# Patient Record
Sex: FEMALE | Race: BLACK OR AFRICAN AMERICAN | NOT HISPANIC OR LATINO | Employment: OTHER | URBAN - METROPOLITAN AREA
[De-identification: names, ages, dates, MRNs, and addresses within clinical notes are randomized per-mention and may not be internally consistent; named-entity substitution may affect disease eponyms.]

---

## 2021-10-08 ENCOUNTER — OFFICE VISIT (OUTPATIENT)
Dept: FAMILY MEDICINE CLINIC | Facility: CLINIC | Age: 81
End: 2021-10-08

## 2021-10-08 VITALS
BODY MASS INDEX: 20.61 KG/M2 | WEIGHT: 136 LBS | HEART RATE: 64 BPM | TEMPERATURE: 97.1 F | OXYGEN SATURATION: 98 % | DIASTOLIC BLOOD PRESSURE: 78 MMHG | RESPIRATION RATE: 16 BRPM | SYSTOLIC BLOOD PRESSURE: 121 MMHG | HEIGHT: 68 IN

## 2021-10-08 DIAGNOSIS — Z09 ENCOUNTER FOR FOLLOW-UP EXAMINATION AFTER COMPLETED TREATMENT FOR CONDITIONS OTHER THAN MALIGNANT NEOPLASM: ICD-10-CM

## 2021-10-08 DIAGNOSIS — E78.5 HYPERLIPIDEMIA, UNSPECIFIED HYPERLIPIDEMIA TYPE: Primary | ICD-10-CM

## 2021-10-08 DIAGNOSIS — Z13.820 SCREENING FOR OSTEOPOROSIS: ICD-10-CM

## 2021-10-08 DIAGNOSIS — Z00.00 GENERAL MEDICAL EXAM: ICD-10-CM

## 2021-10-08 DIAGNOSIS — G47.00 INSOMNIA, UNSPECIFIED TYPE: ICD-10-CM

## 2021-10-08 DIAGNOSIS — Z23 NEED FOR INFLUENZA VACCINATION: ICD-10-CM

## 2021-10-08 DIAGNOSIS — Z12.31 ENCOUNTER FOR SCREENING MAMMOGRAM FOR MALIGNANT NEOPLASM OF BREAST: ICD-10-CM

## 2021-10-08 DIAGNOSIS — N63.12 LUMP IN UPPER INNER QUADRANT OF RIGHT BREAST: ICD-10-CM

## 2021-10-08 PROCEDURE — 99387 INIT PM E/M NEW PAT 65+ YRS: CPT

## 2021-10-08 PROCEDURE — G0008 ADMIN INFLUENZA VIRUS VAC: HCPCS

## 2021-10-08 PROCEDURE — 90662 IIV NO PRSV INCREASED AG IM: CPT

## 2021-10-08 RX ORDER — RISPERIDONE 0.5 MG/1
0.5 TABLET ORAL DAILY
Qty: 90 TABLET | Refills: 1 | Status: SHIPPED | OUTPATIENT
Start: 2021-10-08 | End: 2023-01-20

## 2021-10-08 RX ORDER — PRAVASTATIN SODIUM 80 MG/1
80 TABLET ORAL DAILY
Qty: 90 TABLET | Refills: 1 | Status: SHIPPED | OUTPATIENT
Start: 2021-10-08 | End: 2022-08-05

## 2021-10-08 RX ORDER — RISPERIDONE 0.5 MG/1
TABLET ORAL
COMMUNITY
End: 2021-10-08 | Stop reason: SDUPTHER

## 2021-10-08 RX ORDER — PRAVASTATIN SODIUM 80 MG/1
TABLET ORAL
COMMUNITY
End: 2021-10-08 | Stop reason: SDUPTHER

## 2021-10-08 NOTE — PROGRESS NOTES
"Chief Complaint  Wellness Check    Subjective          Saima Ornelas presents to [facility]  History of Present Illness    Saima Ornelas 80 y.o. female who presents today as a new patient to establish care. The patient recently moved to [place] from [place] into a senior living facility.    She takes a women's daily vitamin. She reports eating a healthy diet \"once a week and I like my sweets.\" She gets exercise by walking a flight of stairs three times per day.    She noticed a small lump in her right breast two months ago. It is non-painful. No personal past history of breast cancer or abnormal mammograms. The patient's grandmother had breast cancer.     She takes Risperidone 0.5 mg daily and Pravastatin 80 mg daily. Both were prescribed from her provider in [place]. The patient is compliant with both medications and reports no side effects.    She drinks 2 glasses of wine per week. No tobacco use.       She has a problem list of the following: There is no problem list on file for this patient.        She has been compliant with the following medications:   Current Outpatient Medications:   •  pravastatin (PRAVACHOL) 80 MG tablet, Take 1 tablet by mouth Daily., Disp: 90 tablet, Rfl: 1  •  risperiDONE (risperDAL) 0.5 MG tablet, Take 1 tablet by mouth Daily., Disp: 90 tablet, Rfl: 1.        She  denies medication side effects.      All of the other chronic condition(s) listed above are stable w/o issues.    /78   Pulse 64   Temp 97.1 °F (36.2 °C)   Resp 16   Ht 172.7 cm (68\")   Wt 61.7 kg (136 lb)   SpO2 98%   BMI 20.68 kg/m²     No results found for this or any previous visit.               Objective     Vital Signs:   /78   Pulse 64   Temp 97.1 °F (36.2 °C)   Resp 16   Ht 172.7 cm (68\")   Wt 61.7 kg (136 lb)   SpO2 98%   BMI 20.68 kg/m²      Review of Systems   Constitutional: Negative for appetite change, fatigue and fever.   HENT: Negative for nosebleeds and " trouble swallowing.    Eyes: Negative for blurred vision and visual disturbance.   Respiratory: Negative for choking, shortness of breath and wheezing.    Cardiovascular: Negative for chest pain and palpitations.   Gastrointestinal: Negative for abdominal pain and blood in stool.   Genitourinary: Negative.  Positive for breast lump (Small lump on right breast). Negative for frequency and urgency.   Musculoskeletal: Negative.  Negative for gait problem.   Skin: Negative.  Negative for rash.   Allergic/Immunologic: Negative for immunocompromised state.   Neurological: Negative for dizziness, syncope, facial asymmetry, speech difficulty, weakness, light-headedness, numbness and headache.   Hematological: Negative for adenopathy.   Psychiatric/Behavioral: Negative for dysphoric mood, self-injury and suicidal ideas.         Physical Exam  Vitals and nursing note reviewed.   Constitutional:       General: She is not in acute distress.     Appearance: Normal appearance. She is well-developed and normal weight. She is not ill-appearing or toxic-appearing.   HENT:      Head: Normocephalic.      Right Ear: External ear normal.      Left Ear: External ear normal.   Eyes:      General: No scleral icterus.     Pupils: Pupils are equal, round, and reactive to light.   Neck:      Thyroid: No thyromegaly.      Vascular: No carotid bruit.   Cardiovascular:      Rate and Rhythm: Normal rate and regular rhythm.      Heart sounds: Normal heart sounds.   Pulmonary:      Effort: Pulmonary effort is normal. No respiratory distress.      Breath sounds: Normal breath sounds. No stridor.   Chest:      Breasts:         Right: No swelling, bleeding, inverted nipple, nipple discharge or tenderness.            Comments: Small, firm, fixed, hard 1 cm lump noted on right breast, located in the upper-inner position at 1:00, approximately 4-5 cm above nipple.   Musculoskeletal:         General: No deformity.      Cervical back: Normal range of  motion and neck supple.      Right lower leg: No edema.      Left lower leg: No edema.   Skin:     General: Skin is warm and dry.      Coloration: Skin is not jaundiced.      Findings: No rash.   Neurological:      General: No focal deficit present.      Mental Status: She is alert and oriented to person, place, and time.      Motor: No weakness.      Coordination: Coordination normal.      Gait: Gait normal.   Psychiatric:         Behavior: Behavior normal.         Thought Content: Thought content normal.         Judgment: Judgment normal.          Result Review :                Assessment and Plan      Diagnoses and all orders for this visit:    1. Hyperlipidemia, unspecified hyperlipidemia type (Primary)  -     pravastatin (PRAVACHOL) 80 MG tablet; Take 1 tablet by mouth Daily.  Dispense: 90 tablet; Refill: 1  -     Lipid panel    2. Lump in upper inner quadrant of right breast  -     Mammo screening digital tomosynthesis bilateral w CAD  -     Mammo Diagnostic Digital Tomosynthesis Right With CAD  -     US breast right complete    3. Screening for osteoporosis  -     DEXA Bone Density Axial    4. General medical exam  -     Comprehensive metabolic panel  -     Lipid panel  -     CBC and Differential  -     TSH    5. Need for influenza vaccination  -     Fluzone High-Dose 65+yrs    6. Insomnia, unspecified type  -     risperiDONE (risperDAL) 0.5 MG tablet; Take 1 tablet by mouth Daily.  Dispense: 90 tablet; Refill: 1    7. Encounter for follow-up examination after completed treatment for conditions other than malignant neoplasm   -     DEXA Bone Density Axial    8. Encounter for screening mammogram for malignant neoplasm of breast   -     Mammo screening digital tomosynthesis bilateral w CAD            Follow Up     Return in about 6 months (around 4/8/2022) for Next scheduled follow up.    Patient was given instructions and counseling regarding her condition or for health maintenance advice. Please see specific  information pulled into the AVS if appropriate. The patient was given information in her after visit summary regarding health maintenance after age 65.     Preventative counseling provided during visit regarding healthy diet, daily exercise, and the following:  Low glycemic index diet  Exercise 30 minutes most days of the week  Make sure you get results on any labs or tests we ordered today  We discussed medications and how to take them as prescribed  Sleep 6-8 hours each night if possible  If you have not signed up for [employer], please activate your code ASAP from your After Visit Summary today    LDL goal <100  HDL goal >60  Triglyceride goal <150  BP goal =<130/80  Fasting glucose <100    -Screening and diagnostic mammogram order, and breast ultrasound order placed today  -DEXA scan order placed today  -Influenza vaccination today  -Lab orders placed today  -Will refill medications  -Will follow-up with this patient in 6 months, and sooner if necessary  -The patient verbalized understanding of all instructions given today

## 2021-10-13 LAB
ALBUMIN SERPL-MCNC: 4.6 G/DL (ref 3.5–5.2)
ALBUMIN/GLOB SERPL: 1.4 G/DL
ALP SERPL-CCNC: 82 U/L (ref 39–117)
ALT SERPL-CCNC: 17 U/L (ref 1–33)
AST SERPL-CCNC: 25 U/L (ref 1–32)
BASOPHILS # BLD AUTO: 0.02 10*3/MM3 (ref 0–0.2)
BASOPHILS NFR BLD AUTO: 0.6 % (ref 0–1.5)
BILIRUB SERPL-MCNC: 0.4 MG/DL (ref 0–1.2)
BUN SERPL-MCNC: 18 MG/DL (ref 8–23)
BUN/CREAT SERPL: 20 (ref 7–25)
CALCIUM SERPL-MCNC: 10.1 MG/DL (ref 8.6–10.5)
CHLORIDE SERPL-SCNC: 105 MMOL/L (ref 98–107)
CHOLEST SERPL-MCNC: 242 MG/DL (ref 0–200)
CO2 SERPL-SCNC: 25.6 MMOL/L (ref 22–29)
CREAT SERPL-MCNC: 0.9 MG/DL (ref 0.57–1)
EOSINOPHIL # BLD AUTO: 0.04 10*3/MM3 (ref 0–0.4)
EOSINOPHIL NFR BLD AUTO: 1.2 % (ref 0.3–6.2)
ERYTHROCYTE [DISTWIDTH] IN BLOOD BY AUTOMATED COUNT: 13.4 % (ref 12.3–15.4)
GLOBULIN SER CALC-MCNC: 3.4 GM/DL
GLUCOSE SERPL-MCNC: 87 MG/DL (ref 65–99)
HCT VFR BLD AUTO: 34 % (ref 34–46.6)
HDLC SERPL-MCNC: 86 MG/DL (ref 40–60)
HGB BLD-MCNC: 10.6 G/DL (ref 12–15.9)
IMM GRANULOCYTES # BLD AUTO: 0.01 10*3/MM3 (ref 0–0.05)
IMM GRANULOCYTES NFR BLD AUTO: 0.3 % (ref 0–0.5)
LDLC SERPL CALC-MCNC: 146 MG/DL (ref 0–100)
LYMPHOCYTES # BLD AUTO: 0.64 10*3/MM3 (ref 0.7–3.1)
LYMPHOCYTES NFR BLD AUTO: 18.9 % (ref 19.6–45.3)
MCH RBC QN AUTO: 29.9 PG (ref 26.6–33)
MCHC RBC AUTO-ENTMCNC: 31.2 G/DL (ref 31.5–35.7)
MCV RBC AUTO: 96 FL (ref 79–97)
MONOCYTES # BLD AUTO: 0.32 10*3/MM3 (ref 0.1–0.9)
MONOCYTES NFR BLD AUTO: 9.5 % (ref 5–12)
NEUTROPHILS # BLD AUTO: 2.35 10*3/MM3 (ref 1.7–7)
NEUTROPHILS NFR BLD AUTO: 69.5 % (ref 42.7–76)
NRBC BLD AUTO-RTO: 0 /100 WBC (ref 0–0.2)
PLATELET # BLD AUTO: 219 10*3/MM3 (ref 140–450)
POTASSIUM SERPL-SCNC: 4 MMOL/L (ref 3.5–5.2)
PROT SERPL-MCNC: 8 G/DL (ref 6–8.5)
RBC # BLD AUTO: 3.54 10*6/MM3 (ref 3.77–5.28)
SODIUM SERPL-SCNC: 141 MMOL/L (ref 136–145)
TRIGL SERPL-MCNC: 63 MG/DL (ref 0–150)
TSH SERPL DL<=0.005 MIU/L-ACNC: 1.96 UIU/ML (ref 0.27–4.2)
VLDLC SERPL CALC-MCNC: 10 MG/DL (ref 5–40)
WBC # BLD AUTO: 3.38 10*3/MM3 (ref 3.4–10.8)

## 2021-10-15 DIAGNOSIS — D64.9 ANEMIA, UNSPECIFIED TYPE: Primary | ICD-10-CM

## 2021-10-15 DIAGNOSIS — D64.9 LOW HEMOGLOBIN: ICD-10-CM

## 2021-10-15 RX ORDER — FERROUS SULFATE 325(65) MG
325 TABLET ORAL 2 TIMES DAILY WITH MEALS
Qty: 60 TABLET | Refills: 2 | Status: SHIPPED | OUTPATIENT
Start: 2021-10-15 | End: 2022-03-07

## 2021-10-15 NOTE — PROGRESS NOTES
Her cholesterol was elevated so I need her to work on a low-cholesterol diet and daily exercise. I want to re-check this in 6 months at her next appointment. Her blood count is abnormal. I want her to start taking Ferrous Sulfate twice per day. I will send a prescription to her pharmacy. Also, I am going to add a referral to GI for her to be evaluated.     *Please fax the lab and add on a serum iron and ferritin due to low hemoglobin.  *I want to re-check a CBC with differential in two weeks. Inform the patient to make a lab appointment for two weeks.

## 2021-10-19 LAB
FERRITIN SERPL-MCNC: 84.5 NG/ML (ref 13–150)
IRON SATN MFR SERPL: 20 % (ref 20–50)
IRON SERPL-MCNC: 74 MCG/DL (ref 37–145)
Lab: NORMAL
TIBC SERPL-MCNC: 375 MCG/DL
UIBC SERPL-MCNC: 301 MCG/DL (ref 112–346)
WRITTEN AUTHORIZATION: NORMAL

## 2021-11-19 ENCOUNTER — TELEPHONE (OUTPATIENT)
Dept: FAMILY MEDICINE CLINIC | Facility: CLINIC | Age: 81
End: 2021-11-19

## 2021-11-19 NOTE — TELEPHONE ENCOUNTER
Caller: VIC ZUNIGA    Relationship to patient: Emergency Contact    Best call back number: [phone]    Patient is needing: PTS SISTER IS CALLING IN REGARDS TO PT POSSIBLY GOING THROUGH HALLUCINATIONS. SHE STATED THAT THE PT STAYS UP ALL NIGHT AND TELLS HER THAT SOMEONE IS HURTING HER DOGS AND FEELS LIKE PEOPLE ARE FOLLOWING HER ALL THE TIME AND DRONES ARE TOO. SHE WOULD LIKE TO DISCUSS THIS WITH APRN VIA. SISTER CANCELLED APPT TODAY DUE TO PT NOT FEELING WELL BUT ARE RESCHEDULING HERE SOON ONCE SISTER TALKS TO PT REGARDING RESCHEDULING

## 2022-01-19 ENCOUNTER — OFFICE VISIT (OUTPATIENT)
Dept: GASTROENTEROLOGY | Facility: CLINIC | Age: 82
End: 2022-01-19

## 2022-01-19 VITALS
WEIGHT: 135 LBS | BODY MASS INDEX: 20.46 KG/M2 | HEIGHT: 68 IN | SYSTOLIC BLOOD PRESSURE: 138 MMHG | DIASTOLIC BLOOD PRESSURE: 80 MMHG

## 2022-01-19 DIAGNOSIS — D64.9 NORMOCYTIC ANEMIA: Primary | ICD-10-CM

## 2022-01-19 PROCEDURE — 99203 OFFICE O/P NEW LOW 30 MIN: CPT | Performed by: INTERNAL MEDICINE

## 2022-01-19 NOTE — PROGRESS NOTES
Chief Complaint   Patient presents with   • Anemia       Subjective     HPI    Saima Ornelas is a 81 y.o. female with a past medical history noted below who presents for anemia. She moved to [place] from [place] and established with a new APRN.  She was found to have a normocytic anemia in October 2021. Iron studies at that time were totally normal with no evidence of iron deficiency.  She was put on oral iron and has been taking that since October.    Not taking NSAIDs    No family history of GI malignancies    No overt bleeding      No abdominal surgeries.    Never smoker, no ETOH.    Retired .      Prior pcp Ramirez Ames MD in [facility]    She has never had a colonoscopy.  She tells me that she does not want one    Today's visit was in the office.  Both the patient and I were wearing face masks and proper hand hygiene was performed before and after the physical exam.           Current Outpatient Medications:   •  ferrous sulfate 325 (65 FE) MG tablet, Take 1 tablet by mouth 2 (Two) Times a Day With Meals., Disp: 60 tablet, Rfl: 2  •  pravastatin (PRAVACHOL) 80 MG tablet, Take 1 tablet by mouth Daily., Disp: 90 tablet, Rfl: 1  •  risperiDONE (risperDAL) 0.5 MG tablet, Take 1 tablet by mouth Daily., Disp: 90 tablet, Rfl: 1      Objective     Vitals:    01/19/22 1355   BP: 138/80         01/19/22  1355   Weight: 61.2 kg (135 lb)     Body mass index is 20.53 kg/m².    Physical Exam  Constitutional:       General: She is not in acute distress.  Pulmonary:      Effort: Pulmonary effort is normal.   Neurological:      Mental Status: She is alert and oriented to person, place, and time.   Psychiatric:         Mood and Affect: Mood normal.         Behavior: Behavior normal.         Thought Content: Thought content normal.         Judgment: Judgment normal.             WBC   Date Value Ref Range Status   10/24/2021 3.87 (L) 4.5 - 11.0 10*3/uL Final     RBC   Date Value Ref Range Status   10/24/2021 3.40 (L) 4.0 -  5.2 10*6/uL Final     Hemoglobin   Date Value Ref Range Status   10/24/2021 10.4 (L) 12.0 - 16.0 g/dL Final     Hematocrit   Date Value Ref Range Status   10/24/2021 32.0 (L) 36.0 - 46.0 % Final     MCV   Date Value Ref Range Status   10/24/2021 94.1 80.0 - 100.0 fL Final     MCH   Date Value Ref Range Status   10/24/2021 30.6 26.0 - 34.0 pg Final     MCHC   Date Value Ref Range Status   10/24/2021 32.5 31.0 - 37.0 g/dL Final     RDW   Date Value Ref Range Status   10/24/2021 14.2 12.0 - 16.8 % Final     MPV   Date Value Ref Range Status   10/24/2021 9.7 8.4 - 12.4 fL Final     Platelets   Date Value Ref Range Status   10/24/2021 209 140 - 440 10*3/uL Final     Neutrophil Rel %   Date Value Ref Range Status   10/24/2021 70.5 45 - 80 % Final     Lymphocyte Rel %   Date Value Ref Range Status   10/24/2021 18.3 15 - 50 % Final     Monocyte Rel %   Date Value Ref Range Status   10/24/2021 9.6 0 - 15 % Final     Eosinophil %   Date Value Ref Range Status   10/24/2021 0.8 0 - 7 % Final     Basophil Rel %   Date Value Ref Range Status   10/24/2021 0.5 0 - 2 % Final     Immature Grans %   Date Value Ref Range Status   10/24/2021 0.3 0.0 - 1.0 % Final     Neutrophils Absolute   Date Value Ref Range Status   10/24/2021 2.73 2.0 - 8.8 10*3/uL Final     Lymphocytes Absolute   Date Value Ref Range Status   10/24/2021 0.71 0.7 - 5.5 10*3/uL Final     Monocytes Absolute   Date Value Ref Range Status   10/24/2021 0.37 0.0 - 1.7 10*3/uL Final     Eosinophils Absolute   Date Value Ref Range Status   10/24/2021 0.03 0.0 - 0.8 10*3/uL Final     Basophils Absolute   Date Value Ref Range Status   10/24/2021 0.02 0.0 - 0.2 10*3/uL Final     Immature Grans, Absolute   Date Value Ref Range Status   10/24/2021 0.01 0.00 - 0.10 10*3/uL Final     nRBC   Date Value Ref Range Status   10/24/2021 0 0 /100(WBC) Final       Lab Results   Component Value Date    GLUCOSE 87 10/12/2021    BUN 18 10/12/2021    CREATININE 0.90 10/12/2021    EGFRIFNONA  60 (L) 10/12/2021    EGFRIFAFRI 73 10/12/2021    BCR 20.0 10/12/2021    CO2 25.6 10/12/2021    CALCIUM 10.1 10/12/2021    PROTENTOTREF 8.0 10/12/2021    ALBUMIN 4.60 10/12/2021    LABIL2 1.4 10/12/2021    AST 25 10/12/2021    ALT 17 10/12/2021         Imaging Results (Last 7 Days)     ** No results found for the last 168 hours. **          I personally reviewed data as detailed below:     The labs listed above. 10/12/21 iron studies with 20% iron sat and ferritin 84    Office notes from: 10/8/21 pcp    No notes on file    Assessment/Plan    1.  Normocytic anemia: She does not recall a history of anemia in the past.  Iron studies are entirely normal range.    Plan  We will repeat her CBC and iron studies today along with B12 and folate.  I discussed with the patient and her sister, that if she does show signs of iron deficiency she will need bidirectional endoscopy.  However her October labs do not show iron deficiency.  She may need to be evaluated by hematology.  Denies any overt bleeding.  She is not interested in colonoscopy for screening purposes    Diagnoses and all orders for this visit:    1. Normocytic anemia (Primary)  -     Ferritin  -     Iron Profile  -     CBC & Differential  -     Vitamin B12  -     Folate        I have discussed the above plan with the patient.  They verbalize understanding and are in agreement with the plan.  They have been advised to contact the office for any questions, concerns, or changes related to their health.    Dictated utilizing Dragon dictation

## 2022-01-20 LAB
BASOPHILS # BLD AUTO: 0 X10E3/UL (ref 0–0.2)
BASOPHILS NFR BLD AUTO: 1 %
EOSINOPHIL # BLD AUTO: 0 X10E3/UL (ref 0–0.4)
EOSINOPHIL NFR BLD AUTO: 1 %
ERYTHROCYTE [DISTWIDTH] IN BLOOD BY AUTOMATED COUNT: 13.6 % (ref 11.7–15.4)
FERRITIN SERPL-MCNC: 110 NG/ML (ref 15–150)
FOLATE SERPL-MCNC: >20 NG/ML
HCT VFR BLD AUTO: 31.1 % (ref 34–46.6)
HGB BLD-MCNC: 10.3 G/DL (ref 11.1–15.9)
IMM GRANULOCYTES # BLD AUTO: 0 X10E3/UL (ref 0–0.1)
IMM GRANULOCYTES NFR BLD AUTO: 0 %
IRON SATN MFR SERPL: 22 % (ref 15–55)
IRON SERPL-MCNC: 68 UG/DL (ref 27–139)
LYMPHOCYTES # BLD AUTO: 0.8 X10E3/UL (ref 0.7–3.1)
LYMPHOCYTES NFR BLD AUTO: 22 %
MCH RBC QN AUTO: 31 PG (ref 26.6–33)
MCHC RBC AUTO-ENTMCNC: 33.1 G/DL (ref 31.5–35.7)
MCV RBC AUTO: 94 FL (ref 79–97)
MONOCYTES # BLD AUTO: 0.4 X10E3/UL (ref 0.1–0.9)
MONOCYTES NFR BLD AUTO: 10 %
NEUTROPHILS # BLD AUTO: 2.5 X10E3/UL (ref 1.4–7)
NEUTROPHILS NFR BLD AUTO: 66 %
PLATELET # BLD AUTO: 214 X10E3/UL (ref 150–450)
RBC # BLD AUTO: 3.32 X10E6/UL (ref 3.77–5.28)
TIBC SERPL-MCNC: 304 UG/DL (ref 250–450)
UIBC SERPL-MCNC: 236 UG/DL (ref 118–369)
VIT B12 SERPL-MCNC: 709 PG/ML (ref 232–1245)
WBC # BLD AUTO: 3.8 X10E3/UL (ref 3.4–10.8)

## 2022-01-24 ENCOUNTER — TELEPHONE (OUTPATIENT)
Dept: GASTROENTEROLOGY | Facility: CLINIC | Age: 82
End: 2022-01-24

## 2022-01-24 DIAGNOSIS — D64.9 NORMOCYTIC ANEMIA: Primary | ICD-10-CM

## 2022-01-24 NOTE — TELEPHONE ENCOUNTER
----- Message from Pam Grigsby MD sent at 1/24/2022  3:30 PM EST -----  Her hemoglobin remains slightly low at 10.3.  She does not have any evidence of iron deficiency.  She does not have any evidence of B12 or folate deficiency.  I think this is probably her norm but will go ahead and send her to hematology for completeness sake to make sure nothing is being missed here.

## 2022-01-24 NOTE — PROGRESS NOTES
Her hemoglobin remains slightly low at 10.3.  She does not have any evidence of iron deficiency.  She does not have any evidence of B12 or folate deficiency.  I think this is probably her norm but will go ahead and send her to hematology for completeness sake to make sure nothing is being missed here.

## 2022-01-25 NOTE — TELEPHONE ENCOUNTER
It is noted pt has upcoming appt with Dr Jules 2/15.     Attempt call to home # - rings without answer.     Per [facility] release, call to daughter, Deb.  Advise per DR Grigsby note.  Verb understanding.  States will try to ensure pt keeps upcoming appt.  Reports pt has episodes of paranoia and refuses to leave the house.

## 2022-02-16 ENCOUNTER — CONSULT (OUTPATIENT)
Dept: ONCOLOGY | Facility: CLINIC | Age: 82
End: 2022-02-16

## 2022-02-16 ENCOUNTER — LAB (OUTPATIENT)
Dept: OTHER | Facility: HOSPITAL | Age: 82
End: 2022-02-16

## 2022-02-16 VITALS
WEIGHT: 133.1 LBS | TEMPERATURE: 97.3 F | BODY MASS INDEX: 22.17 KG/M2 | OXYGEN SATURATION: 98 % | HEART RATE: 87 BPM | DIASTOLIC BLOOD PRESSURE: 92 MMHG | HEIGHT: 65 IN | RESPIRATION RATE: 18 BRPM | SYSTOLIC BLOOD PRESSURE: 184 MMHG

## 2022-02-16 DIAGNOSIS — D64.9 ANEMIA, UNSPECIFIED TYPE: Primary | ICD-10-CM

## 2022-02-16 DIAGNOSIS — D47.2 MGUS (MONOCLONAL GAMMOPATHY OF UNKNOWN SIGNIFICANCE): ICD-10-CM

## 2022-02-16 DIAGNOSIS — D64.9 NORMOCYTIC ANEMIA: Primary | ICD-10-CM

## 2022-02-16 LAB
ABO GROUP BLD: NORMAL
ALBUMIN SERPL-MCNC: 4.6 G/DL (ref 3.5–5.2)
ALBUMIN/GLOB SERPL: 1.2 G/DL
ALP SERPL-CCNC: 74 U/L (ref 39–117)
ALT SERPL W P-5'-P-CCNC: 32 U/L (ref 1–33)
ANION GAP SERPL CALCULATED.3IONS-SCNC: 11.7 MMOL/L (ref 5–15)
AST SERPL-CCNC: 32 U/L (ref 1–32)
BASOPHILS # BLD AUTO: 0.03 10*3/MM3 (ref 0–0.2)
BASOPHILS NFR BLD AUTO: 0.7 % (ref 0–1.5)
BILIRUB SERPL-MCNC: 0.5 MG/DL (ref 0–1.2)
BUN SERPL-MCNC: 18 MG/DL (ref 8–23)
BUN/CREAT SERPL: 20 (ref 7–25)
CALCIUM SPEC-SCNC: 10.4 MG/DL (ref 8.6–10.5)
CHLORIDE SERPL-SCNC: 102 MMOL/L (ref 98–107)
CO2 SERPL-SCNC: 25.3 MMOL/L (ref 22–29)
CREAT SERPL-MCNC: 0.9 MG/DL (ref 0.57–1)
DAT POLY-SP REAG RBC QL: NEGATIVE
DEPRECATED RDW RBC AUTO: 50.2 FL (ref 37–54)
EOSINOPHIL # BLD AUTO: 0.04 10*3/MM3 (ref 0–0.4)
EOSINOPHIL NFR BLD AUTO: 0.9 % (ref 0.3–6.2)
ERYTHROCYTE [DISTWIDTH] IN BLOOD BY AUTOMATED COUNT: 14.5 % (ref 12.3–15.4)
GFR SERPL CREATININE-BSD FRML MDRD: 73 ML/MIN/1.73
GLOBULIN UR ELPH-MCNC: 4 GM/DL
GLUCOSE SERPL-MCNC: 93 MG/DL (ref 65–99)
HAPTOGLOB SERPL-MCNC: 110 MG/DL (ref 30–200)
HCT VFR BLD AUTO: 34.3 % (ref 34–46.6)
HGB BLD-MCNC: 11.3 G/DL (ref 12–15.9)
IMM GRANULOCYTES # BLD AUTO: 0.02 10*3/MM3 (ref 0–0.05)
IMM GRANULOCYTES NFR BLD AUTO: 0.4 % (ref 0–0.5)
LDH SERPL-CCNC: 239 U/L (ref 135–214)
LYMPHOCYTES # BLD AUTO: 0.91 10*3/MM3 (ref 0.7–3.1)
LYMPHOCYTES NFR BLD AUTO: 19.9 % (ref 19.6–45.3)
MCH RBC QN AUTO: 31 PG (ref 26.6–33)
MCHC RBC AUTO-ENTMCNC: 32.9 G/DL (ref 31.5–35.7)
MCV RBC AUTO: 94 FL (ref 79–97)
MONOCYTES # BLD AUTO: 0.52 10*3/MM3 (ref 0.1–0.9)
MONOCYTES NFR BLD AUTO: 11.4 % (ref 5–12)
NEUTROPHILS NFR BLD AUTO: 3.05 10*3/MM3 (ref 1.7–7)
NEUTROPHILS NFR BLD AUTO: 66.7 % (ref 42.7–76)
NRBC BLD AUTO-RTO: 0 /100 WBC (ref 0–0.2)
PLATELET # BLD AUTO: 190 10*3/MM3 (ref 140–450)
PMV BLD AUTO: 10.5 FL (ref 6–12)
POTASSIUM SERPL-SCNC: 4.4 MMOL/L (ref 3.5–5.2)
PROT SERPL-MCNC: 8.6 G/DL (ref 6–8.5)
RBC # BLD AUTO: 3.65 10*6/MM3 (ref 3.77–5.28)
RH BLD: POSITIVE
SODIUM SERPL-SCNC: 139 MMOL/L (ref 136–145)
WBC NRBC COR # BLD: 4.57 10*3/MM3 (ref 3.4–10.8)

## 2022-02-16 PROCEDURE — 83521 IG LIGHT CHAINS FREE EACH: CPT | Performed by: INTERNAL MEDICINE

## 2022-02-16 PROCEDURE — 83615 LACTATE (LD) (LDH) ENZYME: CPT | Performed by: INTERNAL MEDICINE

## 2022-02-16 PROCEDURE — 86901 BLOOD TYPING SEROLOGIC RH(D): CPT

## 2022-02-16 PROCEDURE — 85025 COMPLETE CBC W/AUTO DIFF WBC: CPT | Performed by: INTERNAL MEDICINE

## 2022-02-16 PROCEDURE — 80053 COMPREHEN METABOLIC PANEL: CPT | Performed by: INTERNAL MEDICINE

## 2022-02-16 PROCEDURE — 82668 ASSAY OF ERYTHROPOIETIN: CPT | Performed by: INTERNAL MEDICINE

## 2022-02-16 PROCEDURE — 86334 IMMUNOFIX E-PHORESIS SERUM: CPT | Performed by: INTERNAL MEDICINE

## 2022-02-16 PROCEDURE — 86880 COOMBS TEST DIRECT: CPT | Performed by: INTERNAL MEDICINE

## 2022-02-16 PROCEDURE — 82784 ASSAY IGA/IGD/IGG/IGM EACH: CPT | Performed by: INTERNAL MEDICINE

## 2022-02-16 PROCEDURE — 86900 BLOOD TYPING SEROLOGIC ABO: CPT

## 2022-02-16 PROCEDURE — 99205 OFFICE O/P NEW HI 60 MIN: CPT | Performed by: INTERNAL MEDICINE

## 2022-02-16 PROCEDURE — 84165 PROTEIN E-PHORESIS SERUM: CPT | Performed by: INTERNAL MEDICINE

## 2022-02-16 PROCEDURE — 36415 COLL VENOUS BLD VENIPUNCTURE: CPT

## 2022-02-16 PROCEDURE — 83010 ASSAY OF HAPTOGLOBIN QUANT: CPT | Performed by: INTERNAL MEDICINE

## 2022-02-16 NOTE — PROGRESS NOTES
[facility]    CONSULTING IN BLOOD DISORDERS & CANCER      REASON FOR CONSULTATION/CHIEF COMPLAINT:     Evaluation & management for anemia                             REQUESTING PHYSICIAN: Pam Grigsby MD  RECORDS OBTAINED:  Records of the patients history including those from the electronic medical record were reviewed and summarized in detail.    HISTORY OF PRESENT ILLNESS:    The patient is a 81 y.o. year old female with past medical history significant for depression, HTN & dyslipidemia who was noted to have low Hb/Hct of 10.3/31.1 on a routine CBC from 1/19/22. WBC, platelets & differential were within normal limits. Additional work up showed no evidence of iron, vitamin B12 or folate deficiency. This prompted referral to this clinic.     Patient was first seen in the hematology clinic on 2/16/22. She reports of fatigue and difficulty with sleep. Her main concern is of unsafe feeling at her assisted living facility. She thinks someone is following her and trying to harm her. She has discussed this matter with her family & staff at the facility.   She denies any bleeding from anywhere. Says her appetite is low and has lost some weight. Has never had a colonoscopy.   On chart review,her hemoglobin appears to be in 9-10 gm/dl range at least since October 2021. No prior records available at this time. Patient had recently moved from [place], [place] to [place], [place].     Past Medical History:   Diagnosis Date   • Arthritis    • Depression    • Headache    • Hyperlipidemia    • Hypertension      No past surgical history on file.    MEDICATIONS    Current Outpatient Medications:   •  ferrous sulfate 325 (65 FE) MG tablet, Take 1 tablet by mouth 2 (Two) Times a Day With Meals., Disp: 60 tablet, Rfl: 2  •  pravastatin (PRAVACHOL) 80 MG tablet, Take 1 tablet by mouth Daily., Disp: 90 tablet, Rfl: 1  •  risperiDONE (risperDAL) 0.5 MG tablet, Take 1 tablet by mouth Daily., Disp: 90 tablet, Rfl: 1    ALLERGIES:   No Known  "Allergies    SOCIAL HISTORY:       Social History     Socioeconomic History   • Marital status:    Tobacco Use   • Smoking status: Never Smoker   • Smokeless tobacco: Never Used   • Tobacco comment: social   Vaping Use   • Vaping Use: Never used   Substance and Sexual Activity   • Alcohol use: Yes   • Sexual activity: Defer         FAMILY HISTORY:  No family history on file.    REVIEW OF SYSTEMS:  Constitutional: [No fevers, chills, sweats, c/o fatigue]  Eye: [No recent visual problems]  ENMT: [No ear pain, nasal congestion, sore throat]  Respiratory: [No shortness of breath, cough]  Cardiovascular: [No Chest pain, palpitations, syncope]  Gastrointestinal: [No nausea, vomiting, diarrhea]  Genitourinary: [No hematuria]  Hema/Lymph: [Negative for bruising tendency, swollen lymph glands]  Endocrine: [Negative for excessive thirst, excessive hunger]  Musculoskeletal: [Denies any musculoskeletal pain or swelling]  Integumentary: [No rash, pruritus, abrasions]  Neurologic: [ No weakness or numbness, Alert & awake]         Vitals:    02/16/22 1443   BP: (!) 184/92   Pulse: 87   Resp: 18   Temp: 97.3 °F (36.3 °C)   TempSrc: Temporal   SpO2: 98%   Weight: 60.4 kg (133 lb 1.6 oz)   Height: 166 cm (65.35\")   PainSc: 0-No pain     No flowsheet data found.   PHYSICAL EXAM:    CONSTITUTIONAL:  Vital signs reviewed.  No distress, looks comfortable.  EYES:  Conjunctiva and lids unremarkable.   EARS,NOSE,MOUTH,THROAT:  Ears and nose appear unremarkable.  Lips, teeth, gums appear unremarkable.  RESPIRATORY:  Normal respiratory effort.  Lungs clear to auscultation bilaterally.  CARDIOVASCULAR:  Normal S1, S2.  No murmurs rubs or gallops.  No significant lower extremity edema.  GASTROINTESTINAL: Abdomen appears unremarkable.  Nondistended  LYMPHATIC:  No cervical, supraclavicular lymphadenopathy.  NEURO: AAo x 3, No focal deficits.  Appears to have equal strength all 4 extremities.  MUSCULOSKELETAL:  Unremarkable digits/nails.  " "No cyanosis or clubbing.  No apparent joint deformities.  SKIN:  Warm.  No rashes.  PSYCHIATRIC:  Has flat affect & appears depressed.      RECENT LABS:        Consult on 02/16/2022   Component Date Value Ref Range Status   • Haptoglobin 02/16/2022 110  30 - 200 mg/dL Final   • LDH 02/16/2022 239* 135 - 214 U/L Final   • TIM 02/16/2022 Negative   Final   • IgG 02/16/2022 2241* 586 - 1602 mg/dL Final   • IgA 02/16/2022 156  64 - 422 mg/dL Final   • IgM 02/16/2022 20* 26 - 217 mg/dL Final    Result confirmed on concentration.   • Total Protein 02/16/2022 8.2  6.0 - 8.5 g/dL Final   • Albumin 02/16/2022 4.0  2.9 - 4.4 g/dL Final   • Alpha-1-Globulin 02/16/2022 0.3  0.0 - 0.4 g/dL Final   • Alpha-2-Globulin 02/16/2022 0.8  0.4 - 1.0 g/dL Final   • Beta Globulin 02/16/2022 1.2  0.7 - 1.3 g/dL Final   • Gamma Globulin 02/16/2022 2.0* 0.4 - 1.8 g/dL Final   • M-Nate 02/16/2022 1.6* Not Observed g/dL Final   • Globulin 02/16/2022 4.2* 2.2 - 3.9 g/dL Final   • A/G Ratio 02/16/2022 1.0  0.7 - 1.7 Final   • Immunofixation Reflex, Serum 02/16/2022 Comment*  Final    Immunofixation shows IgG monoclonal protein with kappa light chain  specificity.  Please note that samples from patients receiving DARZALEX(R)  (daratumumab) treatment can appear as an \"IgG kappa\" and mask a  complete response. If this patient is receiving MENDOZA, this HARRISON  assay interference can be removed by ordering test number  [id number]-\"Immunofixation, Daratumumab-Specific, Serum\" and  submitting a new sample for testing or by calling the lab to add  this test to the current sample.   • Please note 02/16/2022 Comment   Final    Protein electrophoresis scan will follow via computer, mail, or   delivery.   • Free Light Chain, Kappa 02/16/2022 51.8* 3.3 - 19.4 mg/L Final   • Free Lambda Light Chains 02/16/2022 6.9  5.7 - 26.3 mg/L Final   • Kappa/Lambda Ratio 02/16/2022 7.51* 0.26 - 1.65 Final   • Glucose 02/16/2022 93  65 - 99 mg/dL Final   • BUN 02/16/2022 " 18  8 - 23 mg/dL Final   • Creatinine 02/16/2022 0.90  0.57 - 1.00 mg/dL Final   • Sodium 02/16/2022 139  136 - 145 mmol/L Final   • Potassium 02/16/2022 4.4  3.5 - 5.2 mmol/L Final   • Chloride 02/16/2022 102  98 - 107 mmol/L Final   • CO2 02/16/2022 25.3  22.0 - 29.0 mmol/L Final   • Calcium 02/16/2022 10.4  8.6 - 10.5 mg/dL Final   • Total Protein 02/16/2022 8.6* 6.0 - 8.5 g/dL Final   • Albumin 02/16/2022 4.60  3.50 - 5.20 g/dL Final   • ALT (SGPT) 02/16/2022 32  1 - 33 U/L Final   • AST (SGOT) 02/16/2022 32  1 - 32 U/L Final   • Alkaline Phosphatase 02/16/2022 74  39 - 117 U/L Final   • Total Bilirubin 02/16/2022 0.5  0.0 - 1.2 mg/dL Final   • eGFR   Amer 02/16/2022 73  >60 mL/min/1.73 Final   • Globulin 02/16/2022 4.0  gm/dL Final   • A/G Ratio 02/16/2022 1.2  g/dL Final   • BUN/Creatinine Ratio 02/16/2022 20.0  7.0 - 25.0 Final   • Anion Gap 02/16/2022 11.7  5.0 - 15.0 mmol/L Final   • Erythropoietin 02/16/2022 20.2* 2.6 - 18.5 mIU/mL Final    [employer] 800 Immunoassay System  Values obtained with different assay methods or kits cannot be used  interchangeably. Results cannot be interpreted as absolute evidence  of the presence or absence of malignant disease.   • ABO Type 02/16/2022 O   Final   • RH type 02/16/2022 Positive   Final   Lab on 02/16/2022   Component Date Value Ref Range Status   • WBC 02/16/2022 4.57  3.40 - 10.80 10*3/mm3 Final   • RBC 02/16/2022 3.65* 3.77 - 5.28 10*6/mm3 Final   • Hemoglobin 02/16/2022 11.3* 12.0 - 15.9 g/dL Final   • Hematocrit 02/16/2022 34.3  34.0 - 46.6 % Final   • MCV 02/16/2022 94.0  79.0 - 97.0 fL Final   • MCH 02/16/2022 31.0  26.6 - 33.0 pg Final   • MCHC 02/16/2022 32.9  31.5 - 35.7 g/dL Final   • RDW 02/16/2022 14.5  12.3 - 15.4 % Final   • RDW-SD 02/16/2022 50.2  37.0 - 54.0 fl Final   • MPV 02/16/2022 10.5  6.0 - 12.0 fL Final   • Platelets 02/16/2022 190  140 - 450 10*3/mm3 Final   • Neutrophil % 02/16/2022 66.7  42.7 - 76.0 % Final   •  Lymphocyte % 02/16/2022 19.9  19.6 - 45.3 % Final   • Monocyte % 02/16/2022 11.4  5.0 - 12.0 % Final   • Eosinophil % 02/16/2022 0.9  0.3 - 6.2 % Final   • Basophil % 02/16/2022 0.7  0.0 - 1.5 % Final   • Immature Grans % 02/16/2022 0.4  0.0 - 0.5 % Final   • Neutrophils, Absolute 02/16/2022 3.05  1.70 - 7.00 10*3/mm3 Final   • Lymphocytes, Absolute 02/16/2022 0.91  0.70 - 3.10 10*3/mm3 Final   • Monocytes, Absolute 02/16/2022 0.52  0.10 - 0.90 10*3/mm3 Final   • Eosinophils, Absolute 02/16/2022 0.04  0.00 - 0.40 10*3/mm3 Final   • Basophils, Absolute 02/16/2022 0.03  0.00 - 0.20 10*3/mm3 Final   • Immature Grans, Absolute 02/16/2022 0.02  0.00 - 0.05 10*3/mm3 Final   • nRBC 02/16/2022 0.0  0.0 - 0.2 /100 WBC Final         ASSESSMENT:   is a 82 y/o AAF with past medical history significant for depression, HTN & dyslipidemia who comes for anemia evaluation & management.     # Normocytic anemia:  · Patient was noted to have low Hb/Hct of 10.3/31.1 on a routine CBC from 1/19/22. WBC, platelets & differential were within normal limits. Additional work up showed no evidence of iron, vitamin B12 or folate deficiency. This prompted referral to this clinic.   · On chart review,her hemoglobin appears to be in 9-10 gm/dl range at least since October 2021. No prior records available at this time. Patient had recently moved from [place], [place] to [place], [place].   · Other than fatigue & possible hallucinations, she denies any major symptoms. Has poor appetite. Denies bleeding from anywhere.   · Work up performed in this clinic on 2/16/22 show improved Hb to 11.3 gm/dl. Additional labs negative for any hemolysis. EPO level elevated. Peripheral smear review performed by me in clinic shows normocytic RBCs, no increased schistocytes, no blasts or increased immature cells, adequate platelets.    · SPEP/HARRISON & FLC show presence of IgG kappa M-protein, 1.6 gm/dl.  FLC ratio mildly elevated at 7.51. No evidence of hypercalcemia, renal  dysfunction or any other cytopenias. This is most consistent with MGUS, intermediate risk & unlikely contributing to anemia.   · Her anemia is likely age related bone marrow suppression +/- nutritional intake.   · Encouraged to increase PO intake and continue PO Iron supplements.   · Will plan to monitor for now & repeat labs in 4-5 months.     # MGUS, low risk:  · The SPEP/HARRISON & FLC from 2/16/22 show presence of IgG kappa M-protein, 1.6 gm/dl.  FLC ratio mildly elevated at 7.51. No evidence of hypercalcemia, renal dysfunction or any other cytopenias.  · This is most consistent with MGUS, intermediate riskn& unlikely contributing to anemia.   · Plan to monitor & repeat labs    # Depressions and suspected hallucinations:  She reports feeling unsafe at her assisted living facility. She thinks someone is following her and trying to harm her. She has discussed this matter with her family & staff at the facility.   Advised to continue to discuss with her family/friends. Recommended referral to psychiatry. Pt refused to referred anywhere.     # Hypertension:  BP elevated at 184/92 in clinic.  Asymptomatic. Pt is not sure if she took her amlodipine  F/u with PCP    PLAN:   - Work up performed in this clinic & so far negative for iron, B12 & folate deficiency. Negative for any hemolysis. EPO level elevated. CBC show improved Hb to 11.3 gm/dl.   - SPEP/HARRISON most consistent with MGUS, intermediate risk & unlikely contributing to anemia.   - Her anemia is likely age related bone marrow suppression +/- nutritional intake.   - Encouraged to increase PO intake and continue PO Iron supplements.   - Will plan to monitor for now & repeat labs in 4-5 months.     Orders Placed This Encounter   Procedures   • Haptoglobin     Order Specific Question:   Release to patient     Answer:   Immediate   • Lactate Dehydrogenase     Order Specific Question:   Release to patient     Answer:   Immediate   • HARRISON,PE and FLC, Serum     Order Specific  Question:   Release to patient     Answer:   Immediate   • Comprehensive Metabolic Panel     Order Specific Question:   Release to patient     Answer:   Immediate   • Erythropoietin     Order Specific Question:   Release to patient     Answer:   Immediate   • Comprehensive Metabolic Panel     Standing Status:   Future     Standing Expiration Date:   2/17/2023     Order Specific Question:   Release to patient     Answer:   Immediate   • HARRISON,PE and FLC, Serum     Standing Status:   Future     Standing Expiration Date:   2/17/2023     Order Specific Question:   Release to patient     Answer:   Immediate   • Direct Antiglobulin Test (Direct Chepe)     Order Specific Question:   Release to patient     Answer:   Immediate   • ABO RH Specimen Verification     Order Specific Question:   Release to patient     Answer:   Immediate   • CBC & Differential     Standing Status:   Future     Standing Expiration Date:   2/17/2023     Order Specific Question:   Manual Differential     Answer:   No     Total time spent during this patient encounter is 65 minutes. The total time spent with the patient includes at least one or more of the following: preparing to see the patient by reviewing of tests, prior notes or other relevant information, performing appropriate independent examination & evaluation, counseling, ordering of medications, tests or procedures, communicating with other healthcare professionals, when appropriate to coordinate care, documenting clinic information in the electronic medical records or other health records, independently interpreting results of tests and communicating the results to the patient/family or caregiver.

## 2022-02-17 LAB
ALBUMIN SERPL ELPH-MCNC: 4 G/DL (ref 2.9–4.4)
ALBUMIN/GLOB SERPL: 1 {RATIO} (ref 0.7–1.7)
ALPHA1 GLOB SERPL ELPH-MCNC: 0.3 G/DL (ref 0–0.4)
ALPHA2 GLOB SERPL ELPH-MCNC: 0.8 G/DL (ref 0.4–1)
B-GLOBULIN SERPL ELPH-MCNC: 1.2 G/DL (ref 0.7–1.3)
EPO SERPL-ACNC: 20.2 MIU/ML (ref 2.6–18.5)
GAMMA GLOB SERPL ELPH-MCNC: 2 G/DL (ref 0.4–1.8)
GLOBULIN SER-MCNC: 4.2 G/DL (ref 2.2–3.9)
IGA SERPL-MCNC: 156 MG/DL (ref 64–422)
IGG SERPL-MCNC: 2241 MG/DL (ref 586–1602)
IGM SERPL-MCNC: 20 MG/DL (ref 26–217)
INTERPRETATION SERPL IEP-IMP: ABNORMAL
KAPPA LC FREE SER-MCNC: 51.8 MG/L (ref 3.3–19.4)
KAPPA LC FREE/LAMBDA FREE SER: 7.51 {RATIO} (ref 0.26–1.65)
LABORATORY COMMENT REPORT: ABNORMAL
LAMBDA LC FREE SERPL-MCNC: 6.9 MG/L (ref 5.7–26.3)
M PROTEIN SERPL ELPH-MCNC: 1.6 G/DL
PROT SERPL-MCNC: 8.2 G/DL (ref 6–8.5)

## 2022-03-02 ENCOUNTER — TELEPHONE (OUTPATIENT)
Dept: ONCOLOGY | Facility: CLINIC | Age: 82
End: 2022-03-02

## 2022-03-02 NOTE — TELEPHONE ENCOUNTER
Caller: Saima Ornelas    Relationship: Self    Best call back number: [phone]    Caller requesting test results:YES    What test was performed: BLOODWORK    When was the test performed:  02/16/22    Where was the test performed: [facility]

## 2022-03-04 NOTE — TELEPHONE ENCOUNTER
Spoke with pt and reviewed Dr Jules's message with her.  She v/u, no questions or concerns about this.  She did inquire about follow up with her PCP, she said when she was there last she ordered some tests that she has not yet scheduled, advised her to call Meredith Ma's office.  She v/u.

## 2022-03-07 DIAGNOSIS — D64.9 ANEMIA, UNSPECIFIED TYPE: ICD-10-CM

## 2022-03-07 RX ORDER — FERROUS SULFATE 325(65) MG
TABLET ORAL
Qty: 60 TABLET | Refills: 0 | Status: SHIPPED | OUTPATIENT
Start: 2022-03-07 | End: 2022-10-06

## 2022-04-08 ENCOUNTER — TELEPHONE (OUTPATIENT)
Dept: FAMILY MEDICINE CLINIC | Facility: CLINIC | Age: 82
End: 2022-04-08

## 2022-04-08 NOTE — TELEPHONE ENCOUNTER
I tried calling patient her phone is discounted. She was sent a my chart message about her appointment .

## 2022-04-08 NOTE — TELEPHONE ENCOUNTER
Caller: Johnson Saima    Relationship: Self    Best call back number: [phone]     What is the best time to reach you: ANYTIME     Who are you requesting to speak with (clinical staff, provider,  specific staff member): CLINICAL    What was the call regarding: PATIENT CALLED TO SAY SHE DID NOT KNOW THAT HER APPOINTMENT FOR TODAY WITH [facility] WAS CANCELLED.   SHE HAD TO GET A RIDE TO THE APPOINTMENT TODAY, AND SHE FOUND OUT WHEN SHE GOT TO THE LAB THAT HER APPOINTMENT WAS  CANCELLED.  PATIENT STATES THAT SHE DOES NOT WANT TO RESCHEDULE THIS APPOINTMENT UNTIL AFTER SHE SPEAKS TO [facility] OR OTHER OFFICE STAFF ABOUT WHY IT WAS CANCELLED AND WHY SHE WAS NOT INFORMED BECAUSE SHE WENT TO A LOT OF TROUBLE GETTING TRANSPORTATION AND GETTING TO THE APPOINTMENT.      Do you require a callback:  YES

## 2022-05-27 ENCOUNTER — TELEPHONE (OUTPATIENT)
Dept: FAMILY MEDICINE CLINIC | Facility: CLINIC | Age: 82
End: 2022-05-27

## 2022-05-27 NOTE — TELEPHONE ENCOUNTER
Caller: VIC ZUNIGA    Relationship: Emergency Contact    Best call back number: [phone]    What is the medical concern/diagnosis: MEMORY ISSUES     What specialty or service is being requested: SOMEONE FOR MEMORY ISSUES     Any additional details: STATES PATIENT HAVING MEMORY ISSUES AND WANTS TO SEE A SPECIALIST

## 2022-05-31 ENCOUNTER — OFFICE VISIT (OUTPATIENT)
Dept: FAMILY MEDICINE CLINIC | Facility: CLINIC | Age: 82
End: 2022-05-31

## 2022-05-31 VITALS
RESPIRATION RATE: 16 BRPM | HEART RATE: 66 BPM | WEIGHT: 133 LBS | SYSTOLIC BLOOD PRESSURE: 158 MMHG | DIASTOLIC BLOOD PRESSURE: 98 MMHG | OXYGEN SATURATION: 99 % | HEIGHT: 65 IN | TEMPERATURE: 97.9 F | BODY MASS INDEX: 22.16 KG/M2

## 2022-05-31 DIAGNOSIS — F41.9 ANXIETY: ICD-10-CM

## 2022-05-31 DIAGNOSIS — R44.3 HALLUCINATIONS: ICD-10-CM

## 2022-05-31 DIAGNOSIS — F43.9 STRESS AT HOME: ICD-10-CM

## 2022-05-31 DIAGNOSIS — G47.9 SLEEP DISTURBANCES: ICD-10-CM

## 2022-05-31 DIAGNOSIS — F32.A DEPRESSION, UNSPECIFIED DEPRESSION TYPE: ICD-10-CM

## 2022-05-31 DIAGNOSIS — I10 PRIMARY HYPERTENSION: Primary | ICD-10-CM

## 2022-05-31 PROBLEM — H16.109 SUPERFICIAL KERATITIS: Status: ACTIVE | Noted: 2022-05-31

## 2022-05-31 PROBLEM — D50.9 IRON DEFICIENCY ANEMIA: Status: ACTIVE | Noted: 2021-10-23

## 2022-05-31 PROBLEM — E78.5 HYPERLIPIDEMIA: Status: ACTIVE | Noted: 2021-10-23

## 2022-05-31 PROBLEM — R68.89 SPELLS OF DECREASED ATTENTIVENESS: Status: ACTIVE | Noted: 2021-10-23

## 2022-05-31 PROBLEM — R42 VERTIGO: Status: ACTIVE | Noted: 2022-05-31

## 2022-05-31 PROBLEM — H25.10 NUCLEAR SENILE CATARACT: Status: ACTIVE | Noted: 2022-05-31

## 2022-05-31 PROCEDURE — 99214 OFFICE O/P EST MOD 30 MIN: CPT

## 2022-05-31 RX ORDER — AMLODIPINE BESYLATE 10 MG/1
10 TABLET ORAL DAILY
COMMUNITY
End: 2022-05-31 | Stop reason: DRUGHIGH

## 2022-05-31 RX ORDER — AMLODIPINE BESYLATE 5 MG/1
5 TABLET ORAL DAILY
Qty: 90 TABLET | Refills: 1 | Status: SHIPPED | OUTPATIENT
Start: 2022-05-31 | End: 2022-11-27

## 2022-05-31 NOTE — PROGRESS NOTES
"Chief Complaint  Hypertension and Depression (Hallucinations)    Subjective          Saima Ornelas presents to [facility]    History of Present Illness    Saima Ornelas 81 y.o. female who presents today for medical management of the below listed issues.     The patient is prescribed Amlodipine 10 mg daily for blood pressure, but she has not been taking medication. The patient reports she was told by an unknown doctor years ago to not take the medication if her blood pressure was within normal limits. She states she has not taken the medication in three months, and she only took 1/2 tablet then.  Her manual blood pressure today is 158/98 mmHg.  She monitors blood pressure at home twice per week. Recently, her blood pressures at home have been 145-150 over  mmHg per patient.  She is asymptomatic.  She denies chest pain, shortness of breath, palpitations, headaches, blurred vision, dizziness, weakness, slurred speech, near-syncope, and syncope.    The patient also reports hallucinations, increased stress, anxiety, depressed mood, and sleep disturbances.  She reports 20-25 people are following her daily. She reports hearing drones flying over her home and obscure smells in her home. The patient has a family friend in attendance for today's visit with her permission, and he verifies the patient's reported symptoms are accurate.  She denies suicidal thoughts, suicidal ideas, and self injury.    She  denies medication side effects.        Objective     Vital Signs:   /98   Pulse 66   Temp 97.9 °F (36.6 °C)   Resp 16   Ht 166 cm (65.35\")   Wt 60.3 kg (133 lb)   SpO2 99%   BMI 21.90 kg/m²      Review of Systems   Constitutional: Negative for appetite change, diaphoresis, fatigue and fever.   HENT: Negative for nosebleeds and trouble swallowing.    Eyes: Negative for blurred vision and visual disturbance.   Respiratory: Negative for choking, chest tightness, shortness of breath and " wheezing.    Cardiovascular: Negative for chest pain and palpitations.   Gastrointestinal: Negative for abdominal pain, blood in stool, nausea and vomiting.   Genitourinary: Negative.    Musculoskeletal: Negative.    Skin: Negative.    Allergic/Immunologic: Negative for immunocompromised state.   Neurological: Negative for dizziness, seizures, syncope, facial asymmetry, speech difficulty, weakness, light-headedness, numbness, headache and memory problem.   Hematological: Negative for adenopathy.   Psychiatric/Behavioral: Positive for hallucinations, sleep disturbance, depressed mood and stress. Negative for behavioral problems, dysphoric mood, self-injury and suicidal ideas. The patient is nervous/anxious.          Physical Exam  Vitals and nursing note reviewed.   Constitutional:       General: She is not in acute distress.     Appearance: Normal appearance. She is well-developed. She is not ill-appearing or toxic-appearing.   HENT:      Head: Normocephalic.      Right Ear: External ear normal.      Left Ear: External ear normal.   Eyes:      General: No scleral icterus.     Pupils: Pupils are equal, round, and reactive to light.   Neck:      Thyroid: No thyromegaly.   Cardiovascular:      Rate and Rhythm: Normal rate and regular rhythm.      Heart sounds: Normal heart sounds.   Pulmonary:      Effort: Pulmonary effort is normal. No respiratory distress.      Breath sounds: Normal breath sounds. No stridor.   Musculoskeletal:         General: No deformity.      Cervical back: Normal range of motion and neck supple.   Skin:     General: Skin is warm.      Coloration: Skin is not jaundiced.   Neurological:      General: No focal deficit present.      Mental Status: She is alert. She is confused.      Cranial Nerves: No dysarthria or facial asymmetry.      Sensory: Sensation is intact.      Motor: No weakness, tremor, atrophy or abnormal muscle tone.      Coordination: Coordination is intact.      Gait: Gait is  intact. Gait normal.   Psychiatric:         Attention and Perception: Attention normal.         Mood and Affect: Mood is anxious. Mood is not depressed. Affect is not tearful.         Speech: Speech normal. Speech is not slurred.         Behavior: Behavior normal.         Thought Content: Thought content normal. Thought content does not include suicidal ideation. Thought content does not include suicidal plan.         Judgment: Judgment normal.                     Assessment and Plan      Diagnoses and all orders for this visit:    1. Primary hypertension (Primary)  Comments:  Uncontrolled, not taking medication  Start Amlodipine 5 mg daily  Low-sodium diet, decrease stress and caffeine. Check BP at home.  Follow-up in 4 weeks  Orders:  -     amLODIPine (NORVASC) 5 MG tablet; Take 1 tablet by mouth Daily for 180 days.  Dispense: 90 tablet; Refill: 1    2. Hallucinations  Comments:  Initial encounter  Referral to Psychiatry for further evaluation  Orders:  -     Cancel: Ambulatory Referral to Psychiatry  -     Ambulatory Referral to Psychiatry    3. Depression, unspecified depression type  Comments:  Initial encounter  Denies suicidal thoughts or plan  Referral to Psychiatry for further evaluation  Orders:  -     Cancel: Ambulatory Referral to Psychiatry  -     Ambulatory Referral to Psychiatry    4. Stress at home  Comments:  Initial encounter  Referral to Psychiatry for further evaluation  Orders:  -     Cancel: Ambulatory Referral to Psychiatry  -     Ambulatory Referral to Psychiatry    5. Sleep disturbances  Comments:  Initial encounter  Referral to Psychiatry for further evaluation  Orders:  -     Cancel: Ambulatory Referral to Psychiatry  -     Ambulatory Referral to Psychiatry    6. Anxiety  Comments:  Initial encounter  Referral to Psychiatry for further evaluation  Orders:  -     Cancel: Ambulatory Referral to Psychiatry  -     Ambulatory Referral to Psychiatry            Follow Up     Return in about 4  weeks (around 6/28/2022) for Next scheduled follow up.    Patient was given instructions and counseling regarding her condition or for health maintenance advice. Please see specific information pulled into the AVS if appropriate.     -Bonds for safety were provided today. The patient was encouraged to seek immediate mental health evaluation at [facility] emergency psychiatric services for worsening hallucinations, depression, suicidal thoughts, suicidal plan, or thoughts of self-harm.   -Follow up in 4 weeks for blood pressure recheck.

## 2022-06-06 DIAGNOSIS — N63.12 LUMP IN UPPER INNER QUADRANT OF RIGHT BREAST: Primary | ICD-10-CM

## 2022-07-11 ENCOUNTER — LAB (OUTPATIENT)
Dept: OTHER | Facility: HOSPITAL | Age: 82
End: 2022-07-11

## 2022-07-11 ENCOUNTER — OFFICE VISIT (OUTPATIENT)
Dept: ONCOLOGY | Facility: CLINIC | Age: 82
End: 2022-07-11

## 2022-07-11 VITALS
TEMPERATURE: 97.1 F | DIASTOLIC BLOOD PRESSURE: 88 MMHG | BODY MASS INDEX: 22.36 KG/M2 | RESPIRATION RATE: 16 BRPM | WEIGHT: 134.2 LBS | OXYGEN SATURATION: 98 % | HEIGHT: 65 IN | HEART RATE: 64 BPM | SYSTOLIC BLOOD PRESSURE: 151 MMHG

## 2022-07-11 DIAGNOSIS — D64.9 ANEMIA, UNSPECIFIED TYPE: Primary | ICD-10-CM

## 2022-07-11 DIAGNOSIS — D64.9 ANEMIA, UNSPECIFIED TYPE: ICD-10-CM

## 2022-07-11 DIAGNOSIS — D47.2 MGUS (MONOCLONAL GAMMOPATHY OF UNKNOWN SIGNIFICANCE): ICD-10-CM

## 2022-07-11 LAB
ALBUMIN SERPL-MCNC: 4.3 G/DL (ref 3.5–5.2)
ALBUMIN/GLOB SERPL: 1.1 G/DL
ALP SERPL-CCNC: 72 U/L (ref 39–117)
ALT SERPL W P-5'-P-CCNC: 25 U/L (ref 1–33)
ANION GAP SERPL CALCULATED.3IONS-SCNC: 9.8 MMOL/L (ref 5–15)
AST SERPL-CCNC: 30 U/L (ref 1–32)
BASOPHILS # BLD AUTO: 0.02 10*3/MM3 (ref 0–0.2)
BASOPHILS NFR BLD AUTO: 0.5 % (ref 0–1.5)
BILIRUB SERPL-MCNC: 0.3 MG/DL (ref 0–1.2)
BUN SERPL-MCNC: 18 MG/DL (ref 8–23)
BUN/CREAT SERPL: 19.1 (ref 7–25)
CALCIUM SPEC-SCNC: 9.7 MG/DL (ref 8.6–10.5)
CHLORIDE SERPL-SCNC: 106 MMOL/L (ref 98–107)
CO2 SERPL-SCNC: 25.2 MMOL/L (ref 22–29)
CREAT SERPL-MCNC: 0.94 MG/DL (ref 0.57–1)
DEPRECATED RDW RBC AUTO: 53.7 FL (ref 37–54)
EGFRCR SERPLBLD CKD-EPI 2021: 61.5 ML/MIN/1.73
EOSINOPHIL # BLD AUTO: 0.04 10*3/MM3 (ref 0–0.4)
EOSINOPHIL NFR BLD AUTO: 1 % (ref 0.3–6.2)
ERYTHROCYTE [DISTWIDTH] IN BLOOD BY AUTOMATED COUNT: 14.7 % (ref 12.3–15.4)
GLOBULIN UR ELPH-MCNC: 4 GM/DL
GLUCOSE SERPL-MCNC: 94 MG/DL (ref 65–99)
HCT VFR BLD AUTO: 32.4 % (ref 34–46.6)
HGB BLD-MCNC: 10.3 G/DL (ref 12–15.9)
IMM GRANULOCYTES # BLD AUTO: 0.01 10*3/MM3 (ref 0–0.05)
IMM GRANULOCYTES NFR BLD AUTO: 0.3 % (ref 0–0.5)
LYMPHOCYTES # BLD AUTO: 0.7 10*3/MM3 (ref 0.7–3.1)
LYMPHOCYTES NFR BLD AUTO: 17.6 % (ref 19.6–45.3)
MCH RBC QN AUTO: 31.1 PG (ref 26.6–33)
MCHC RBC AUTO-ENTMCNC: 31.8 G/DL (ref 31.5–35.7)
MCV RBC AUTO: 97.9 FL (ref 79–97)
MONOCYTES # BLD AUTO: 0.39 10*3/MM3 (ref 0.1–0.9)
MONOCYTES NFR BLD AUTO: 9.8 % (ref 5–12)
NEUTROPHILS NFR BLD AUTO: 2.81 10*3/MM3 (ref 1.7–7)
NEUTROPHILS NFR BLD AUTO: 70.8 % (ref 42.7–76)
NRBC BLD AUTO-RTO: 0 /100 WBC (ref 0–0.2)
PLATELET # BLD AUTO: 218 10*3/MM3 (ref 140–450)
PMV BLD AUTO: 9.7 FL (ref 6–12)
POTASSIUM SERPL-SCNC: 4.1 MMOL/L (ref 3.5–5.2)
PROT SERPL-MCNC: 8.3 G/DL (ref 6–8.5)
RBC # BLD AUTO: 3.31 10*6/MM3 (ref 3.77–5.28)
SODIUM SERPL-SCNC: 141 MMOL/L (ref 136–145)
WBC NRBC COR # BLD: 3.97 10*3/MM3 (ref 3.4–10.8)

## 2022-07-11 PROCEDURE — 84165 PROTEIN E-PHORESIS SERUM: CPT | Performed by: INTERNAL MEDICINE

## 2022-07-11 PROCEDURE — 36415 COLL VENOUS BLD VENIPUNCTURE: CPT

## 2022-07-11 PROCEDURE — 80053 COMPREHEN METABOLIC PANEL: CPT | Performed by: INTERNAL MEDICINE

## 2022-07-11 PROCEDURE — 83521 IG LIGHT CHAINS FREE EACH: CPT | Performed by: INTERNAL MEDICINE

## 2022-07-11 PROCEDURE — 86334 IMMUNOFIX E-PHORESIS SERUM: CPT | Performed by: INTERNAL MEDICINE

## 2022-07-11 PROCEDURE — 85025 COMPLETE CBC W/AUTO DIFF WBC: CPT | Performed by: INTERNAL MEDICINE

## 2022-07-11 PROCEDURE — 82784 ASSAY IGA/IGD/IGG/IGM EACH: CPT | Performed by: INTERNAL MEDICINE

## 2022-07-11 PROCEDURE — 99215 OFFICE O/P EST HI 40 MIN: CPT | Performed by: INTERNAL MEDICINE

## 2022-07-11 NOTE — PROGRESS NOTES
[facility]    CONSULTING IN BLOOD DISORDERS & CANCER      REASON FOR CONSULTATION/CHIEF COMPLAINT:     Evaluation & management for anemia                             REQUESTING PHYSICIAN: No ref. provider found  RECORDS OBTAINED:  Records of the patients history including those from the electronic medical record were reviewed and summarized in detail.    HISTORY OF PRESENT ILLNESS:    The patient is a 81 y.o. year old female with past medical history significant for depression, HTN & dyslipidemia who was noted to have low Hb/Hct of 10.3/31.1 on a routine CBC from 1/19/22. WBC, platelets & differential were within normal limits. Additional work up showed no evidence of iron, vitamin B12 or folate deficiency. This prompted referral to this clinic.     Patient was first seen in the hematology clinic on 2/16/22. She reports of fatigue and difficulty with sleep. Her main concern is of unsafe feeling at her assisted living facility. She thinks someone is following her and trying to harm her. She has discussed this matter with her family & staff at the facility.   She denies any bleeding from anywhere. Says her appetite is low and has lost some weight. Has never had a colonoscopy.   On chart review,her hemoglobin appears to be in 9-10 gm/dl range at least since October 2021. No prior records available at this time. Patient had recently moved from [place], [place] to [place], [place].     Work-up performed in this clinic on 2/16/22 show improved Hb to 11.3 gm/dl. Additional labs negative for any hemolysis. EPO level elevated. Peripheral smear review performed by me in clinic shows normocytic RBCs, no increased schistocytes, no blasts or increased immature cells, adequate platelets.    SPEP/HARRISON & FLC show presence of IgG kappa M-protein, 1.6 gm/dl.  FLC ratio mildly elevated at 7.51. No evidence of hypercalcemia, renal dysfunction or any other cytopenias. This is most consistent with MGUS, intermediate risk & unlikely contributing to  anemia.   Her anemia is likely age related bone marrow suppression +/- nutritional intake.     INTERIM HISTORY:  Patient returns to the clinic for a follow-up visit with her daughter.  She denies any new complaints.  She continues to feel very fatigued.  She continues to be stressed about her living situation where she is concerned someone is following her.  She is quite distressed about her not being able to be as active but as when she was working.  She takes iron tablets every day without any adverse effects.    Past Medical History:   Diagnosis Date   • Arthritis    • Depression    • Headache    • Hyperlipidemia    • Hypertension      No past surgical history on file.    MEDICATIONS    Current Outpatient Medications:   •  amLODIPine (NORVASC) 5 MG tablet, Take 1 tablet by mouth Daily for 180 days., Disp: 90 tablet, Rfl: 1  •  FeroSul 325 (65 Fe) MG tablet, TAKE 1 TABLET BY MOUTH TWICE DAILY WITH MEALS, Disp: 60 tablet, Rfl: 0  •  pravastatin (PRAVACHOL) 80 MG tablet, Take 1 tablet by mouth Daily., Disp: 90 tablet, Rfl: 1  •  risperiDONE (risperDAL) 0.5 MG tablet, Take 1 tablet by mouth Daily., Disp: 90 tablet, Rfl: 1    ALLERGIES:   No Known Allergies    SOCIAL HISTORY:       Social History     Socioeconomic History   • Marital status:    Tobacco Use   • Smoking status: Never Smoker   • Smokeless tobacco: Never Used   • Tobacco comment: social   Vaping Use   • Vaping Use: Never used   Substance and Sexual Activity   • Alcohol use: Yes   • Sexual activity: Defer         FAMILY HISTORY:  No family history on file.  Nonpertinent  REVIEW OF SYSTEMS:  Constitutional: [No fevers, chills, sweats, c/o fatigue]  Eye: [No recent visual problems]  ENMT: [No ear pain, nasal congestion, sore throat]  Respiratory: [No shortness of breath, cough]  Cardiovascular: [No Chest pain, palpitations, syncope]  Gastrointestinal: [No nausea, vomiting, diarrhea]  Genitourinary: [No hematuria]  Hema/Lymph: [Negative for bruising  "tendency, swollen lymph glands]  Endocrine: [Negative for excessive thirst, excessive hunger]  Musculoskeletal: [Denies any musculoskeletal pain or swelling]  Integumentary: [No rash, pruritus, abrasions]  Neurologic: [ No weakness or numbness, Alert & awake]         Vitals:    07/11/22 1134   BP: 151/88  Comment: TAKES BP MED IN THE AFTERNOON   Pulse: 64   Resp: 16   Temp: 97.1 °F (36.2 °C)   TempSrc: Temporal   SpO2: 98%   Weight: 60.9 kg (134 lb 3.2 oz)   Height: 166 cm (65.35\")   PainSc: 0-No pain     Current Status 7/11/2022   ECOG score 0      PHYSICAL EXAM:    CONSTITUTIONAL:  Vital signs reviewed.  No distress, looks comfortable.  EYES:  Conjunctiva and lids unremarkable.   EARS,NOSE,MOUTH,THROAT:  Ears and nose appear unremarkable.  RESPIRATORY:  Normal respiratory effort.  Lungs clear to auscultation bilaterally.  CARDIOVASCULAR:  Normal S1, S2.  No murmurs rubs or gallops.  No significant lower extremity edema.  GASTROINTESTINAL: Abdomen appears unremarkable.  Nondistended  LYMPHATIC:  No cervical, supraclavicular lymphadenopathy.  NEURO: AAo x 3, No focal deficits.  Appears to have equal strength all 4 extremities.  MUSCULOSKELETAL:  Unremarkable digits/nails.  No cyanosis or clubbing.  No apparent joint deformities.  SKIN:  Warm.  No rashes.  PSYCHIATRIC:  Has flat affect & appears depressed.      RECENT LABS:        Lab on 07/11/2022   Component Date Value Ref Range Status   • Glucose 07/11/2022 94  65 - 99 mg/dL Final   • BUN 07/11/2022 18  8 - 23 mg/dL Final   • Creatinine 07/11/2022 0.94  0.57 - 1.00 mg/dL Final   • Sodium 07/11/2022 141  136 - 145 mmol/L Final   • Potassium 07/11/2022 4.1  3.5 - 5.2 mmol/L Final   • Chloride 07/11/2022 106  98 - 107 mmol/L Final   • CO2 07/11/2022 25.2  22.0 - 29.0 mmol/L Final   • Calcium 07/11/2022 9.7  8.6 - 10.5 mg/dL Final   • Total Protein 07/11/2022 8.3  6.0 - 8.5 g/dL Final   • Albumin 07/11/2022 4.30  3.50 - 5.20 g/dL Final   • ALT (SGPT) 07/11/2022 25  1 " - 33 U/L Final   • AST (SGOT) 07/11/2022 30  1 - 32 U/L Final   • Alkaline Phosphatase 07/11/2022 72  39 - 117 U/L Final   • Total Bilirubin 07/11/2022 0.3  0.0 - 1.2 mg/dL Final   • Globulin 07/11/2022 4.0  gm/dL Final   • A/G Ratio 07/11/2022 1.1  g/dL Final   • BUN/Creatinine Ratio 07/11/2022 19.1  7.0 - 25.0 Final   • Anion Gap 07/11/2022 9.8  5.0 - 15.0 mmol/L Final   • eGFR 07/11/2022 61.5  >60.0 mL/min/1.73 Final    National Kidney Foundation and American Society of Nephrology (ASN) Task Force recommended calculation based on the Chronic Kidney Disease Epidemiology Collaboration (CKD-EPI) equation refit without adjustment for race.   • WBC 07/11/2022 3.97  3.40 - 10.80 10*3/mm3 Final   • RBC 07/11/2022 3.31 (A) 3.77 - 5.28 10*6/mm3 Final   • Hemoglobin 07/11/2022 10.3 (A) 12.0 - 15.9 g/dL Final   • Hematocrit 07/11/2022 32.4 (A) 34.0 - 46.6 % Final   • MCV 07/11/2022 97.9 (A) 79.0 - 97.0 fL Final   • MCH 07/11/2022 31.1  26.6 - 33.0 pg Final   • MCHC 07/11/2022 31.8  31.5 - 35.7 g/dL Final   • RDW 07/11/2022 14.7  12.3 - 15.4 % Final   • RDW-SD 07/11/2022 53.7  37.0 - 54.0 fl Final   • MPV 07/11/2022 9.7  6.0 - 12.0 fL Final   • Platelets 07/11/2022 218  140 - 450 10*3/mm3 Final   • Neutrophil % 07/11/2022 70.8  42.7 - 76.0 % Final   • Lymphocyte % 07/11/2022 17.6 (A) 19.6 - 45.3 % Final   • Monocyte % 07/11/2022 9.8  5.0 - 12.0 % Final   • Eosinophil % 07/11/2022 1.0  0.3 - 6.2 % Final   • Basophil % 07/11/2022 0.5  0.0 - 1.5 % Final   • Immature Grans % 07/11/2022 0.3  0.0 - 0.5 % Final   • Neutrophils, Absolute 07/11/2022 2.81  1.70 - 7.00 10*3/mm3 Final   • Lymphocytes, Absolute 07/11/2022 0.70  0.70 - 3.10 10*3/mm3 Final   • Monocytes, Absolute 07/11/2022 0.39  0.10 - 0.90 10*3/mm3 Final   • Eosinophils, Absolute 07/11/2022 0.04  0.00 - 0.40 10*3/mm3 Final   • Basophils, Absolute 07/11/2022 0.02  0.00 - 0.20 10*3/mm3 Final   • Immature Grans, Absolute 07/11/2022 0.01  0.00 - 0.05 10*3/mm3 Final   • nRBC  07/11/2022 0.0  0.0 - 0.2 /100 WBC Final         ASSESSMENT:   is a 82 y/o AAF with past medical history significant for depression, HTN & dyslipidemia who comes for anemia evaluation & management.     # Normocytic anemia:  · Patient was noted to have low Hb/Hct of 10.3/31.1 on a routine CBC from 1/19/22. WBC, platelets & differential were within normal limits. Additional work up showed no evidence of iron, vitamin B12 or folate deficiency. This prompted referral to this clinic.   · On chart review,her hemoglobin appears to be in 9-10 gm/dl range at least since October 2021. No prior records available at this time. Patient had recently moved from [place], [place] to [place], [place].   · Other than fatigue & possible hallucinations, she denies any major symptoms. Has poor appetite. Denies bleeding from anywhere.   · Work up performed in this clinic on 2/16/22 show improved Hb to 11.3 gm/dl. Additional labs negative for any hemolysis. EPO level elevated. Peripheral smear review performed by me in clinic shows normocytic RBCs, no increased schistocytes, no blasts or increased immature cells, adequate platelets.    · SPEP/HARRISON & FLC show presence of IgG kappa M-protein, 1.6 gm/dl.  FLC ratio mildly elevated at 7.51. No evidence of hypercalcemia, renal dysfunction or any other cytopenias. This is most consistent with MGUS, intermediate risk & unlikely contributing to anemia.   · Her anemia is likely age related bone marrow suppression +/- nutritional intake.   · CBC from 7/11/2022 shows overall stable hemoglobin at 10.3.  · Encouraged to increase PO intake and continue PO Iron supplements.   · Will plan to monitor for now & repeat labs in 6 months.     # MGUS, low risk:  · The SPEP/HARRISON & FLC from 2/16/22 show presence of IgG kappa M-protein, 1.6 gm/dl.  FLC ratio mildly elevated at 7.51. No evidence of hypercalcemia, renal dysfunction or any other cytopenias.  · This is most consistent with MGUS, intermediate risk &  unlikely contributing to anemia.   · Plan to monitor & repeat labs at follow-up.    # Depressions and suspected hallucinations:  · She reports feeling unsafe at her assisted living facility. She thinks someone is following her and trying to harm her. She has discussed this matter with her family & staff at the facility.   · Advised to continue to discuss with her family/friends.  She has been referred to psychiatry by her PCP.  · We will also refer to our  for possible assistance.    # Hypertension:  BP better controlled now  Asymptomatic.   F/u with PCP    PLAN:   - Work up performed in this clinic & so far negative for iron, B12 & folate deficiency. Negative for any hemolysis. EPO level elevated. CBC show stable Hb to 10.3 gm/dl.   - SPEP/HARRISON most consistent with MGUS, intermediate risk & unlikely contributing to anemia.   - Her anemia is likely age related bone marrow suppression +/- nutritional intake.   - Encouraged to increase PO intake and continue PO Iron supplements.   - Will plan to monitor for now & repeat labs in 6 months.     Orders Placed This Encounter   Procedures   • Comprehensive Metabolic Panel     Standing Status:   Future     Standing Expiration Date:   7/11/2023     Order Specific Question:   Release to patient     Answer:   Immediate   • HARRISON,PE and FLC, Serum     Standing Status:   Future     Standing Expiration Date:   7/11/2023     Order Specific Question:   Release to patient     Answer:   Immediate   • Ferritin     Standing Status:   Future     Standing Expiration Date:   7/12/2023   • Iron Profile     Standing Status:   Future     Standing Expiration Date:   7/12/2023   • CBC & Differential     Standing Status:   Future     Standing Expiration Date:   7/11/2023     Order Specific Question:   Manual Differential     Answer:   No   Total time spent during this patient encounter is 45 minutes. The total time spent with the patient includes at least one or more of the following:  preparing to see the patient by reviewing of tests, prior notes or other relevant information, performing appropriate independent examination & evaluation, counseling, ordering of medications, tests or procedures, communicating with other healthcare professionals, when appropriate to coordinate care, documenting clinic information in the electronic medical records or other health records, independently interpreting results of tests and communicating the results to the patient/family or caregiver.

## 2022-07-13 LAB
ALBUMIN SERPL ELPH-MCNC: 4.1 G/DL (ref 2.9–4.4)
ALBUMIN/GLOB SERPL: 1.1 {RATIO} (ref 0.7–1.7)
ALPHA1 GLOB SERPL ELPH-MCNC: 0.3 G/DL (ref 0–0.4)
ALPHA2 GLOB SERPL ELPH-MCNC: 0.7 G/DL (ref 0.4–1)
B-GLOBULIN SERPL ELPH-MCNC: 1.1 G/DL (ref 0.7–1.3)
GAMMA GLOB SERPL ELPH-MCNC: 1.8 G/DL (ref 0.4–1.8)
GLOBULIN SER-MCNC: 3.9 G/DL (ref 2.2–3.9)
IGA SERPL-MCNC: 144 MG/DL (ref 64–422)
IGG SERPL-MCNC: 2269 MG/DL (ref 586–1602)
IGM SERPL-MCNC: 16 MG/DL (ref 26–217)
INTERPRETATION SERPL IEP-IMP: ABNORMAL
KAPPA LC FREE SER-MCNC: 80.8 MG/L (ref 3.3–19.4)
KAPPA LC FREE/LAMBDA FREE SER: 8.98 {RATIO} (ref 0.26–1.65)
LABORATORY COMMENT REPORT: ABNORMAL
LAMBDA LC FREE SERPL-MCNC: 9 MG/L (ref 5.7–26.3)
M PROTEIN SERPL ELPH-MCNC: 1.5 G/DL
PROT SERPL-MCNC: 8 G/DL (ref 6–8.5)

## 2022-07-26 ENCOUNTER — HOSPITAL ENCOUNTER (OUTPATIENT)
Dept: ULTRASOUND IMAGING | Facility: HOSPITAL | Age: 82
Discharge: HOME | End: 2022-07-26

## 2022-07-26 ENCOUNTER — HOSPITAL ENCOUNTER (OUTPATIENT)
Dept: MAMMOGRAPHY | Facility: HOSPITAL | Age: 82
Discharge: HOME | End: 2022-07-26

## 2022-07-26 DIAGNOSIS — N63.12 MASS OF UPPER INNER QUADRANT OF RIGHT BREAST: Primary | ICD-10-CM

## 2022-07-26 PROCEDURE — 76642 ULTRASOUND BREAST LIMITED: CPT

## 2022-07-26 PROCEDURE — 77066 DX MAMMO INCL CAD BI: CPT

## 2022-07-26 PROCEDURE — G0279 TOMOSYNTHESIS, MAMMO: HCPCS

## 2022-07-27 ENCOUNTER — TELEPHONE (OUTPATIENT)
Dept: SURGERY | Facility: CLINIC | Age: 82
End: 2022-07-27

## 2022-07-27 NOTE — TELEPHONE ENCOUNTER
Home number is to her sister. She told me to call the cell number to reach patient. I called that number and received a message that the person was unavailable. I tried to call the sister back since she said she was heading over to the patient's house shortly to have her call the office but it went to v/m and the v/m was full. I will call back.

## 2022-08-01 ENCOUNTER — TELEPHONE (OUTPATIENT)
Dept: SURGERY | Facility: CLINIC | Age: 82
End: 2022-08-01

## 2022-08-01 NOTE — TELEPHONE ENCOUNTER
I left a message on the home number provided to have the patient call our office to schedule an appt. The patient's cell number has a message to try call again later.   Patient needs appt asap with Brayden Loco.

## 2022-08-02 ENCOUNTER — TELEPHONE (OUTPATIENT)
Dept: SURGERY | Facility: CLINIC | Age: 82
End: 2022-08-02

## 2022-08-02 NOTE — TELEPHONE ENCOUNTER
Spoke with sister regarding Ms. Ornelas needing an appt with Brayden Loco. She said she was going to go over to Ms. Ornelas's house later and will give her the message to call. I gave her the number.

## 2022-08-05 DIAGNOSIS — E78.5 HYPERLIPIDEMIA, UNSPECIFIED HYPERLIPIDEMIA TYPE: ICD-10-CM

## 2022-08-05 RX ORDER — PRAVASTATIN SODIUM 80 MG/1
80 TABLET ORAL DAILY
Qty: 90 TABLET | Refills: 1 | Status: SHIPPED | OUTPATIENT
Start: 2022-08-05

## 2022-08-15 ENCOUNTER — TELEPHONE (OUTPATIENT)
Dept: FAMILY MEDICINE CLINIC | Facility: CLINIC | Age: 82
End: 2022-08-15

## 2022-08-15 ENCOUNTER — TELEPHONE (OUTPATIENT)
Dept: SURGERY | Facility: CLINIC | Age: 82
End: 2022-08-15

## 2022-08-15 NOTE — TELEPHONE ENCOUNTER
----- Message from Gisselle Morton MA sent at 8/15/2022  2:28 PM EDT -----  Regarding: new patient referral  Patient was referred to our office by Meredith Via NP  Patient has canceled her new patient apt with us for tomorrow  Per her sister Korin she does not wish to reschedule at this time   She is a BIRADS 4 and needs a stereotactic breast biopsy.     We are happy to get her rescheduled asap when she is agreeable.     Thank you, gisselle

## 2022-08-15 NOTE — TELEPHONE ENCOUNTER
Patient was referred to our office by Meredith Ma NP  Patient has canceled her new patient apt with us for tomorrow  Per her sister Korin she does not wish to reschedule at this time   She is a BIRADS 4 and needs a stereotactic breast biopsy.     We are happy to get her rescheduled asap when she is agreeable.     Messaged Meredith Ma's office to let her know.

## 2022-09-21 ENCOUNTER — HOSPITAL ENCOUNTER (OUTPATIENT)
Dept: BONE DENSITY | Facility: HOSPITAL | Age: 82
End: 2022-09-21

## 2022-10-06 DIAGNOSIS — D64.9 ANEMIA, UNSPECIFIED TYPE: ICD-10-CM

## 2022-10-06 RX ORDER — FERROUS SULFATE 325(65) MG
TABLET ORAL
Qty: 30 TABLET | Refills: 0 | Status: SHIPPED | OUTPATIENT
Start: 2022-10-06 | End: 2022-12-27

## 2022-10-27 ENCOUNTER — TELEPHONE (OUTPATIENT)
Dept: FAMILY MEDICINE CLINIC | Facility: CLINIC | Age: 82
End: 2022-10-27

## 2022-10-27 NOTE — TELEPHONE ENCOUNTER
PATIENT'S DAUGHTER OLIVIER CALLED AND WANTED TO VERIFY RECEIVING OF [employer] SHE[employer]. SHE FAXED THEM YESTERDAY.     PLEASE CALL [phone]

## 2022-10-28 ENCOUNTER — OFFICE VISIT (OUTPATIENT)
Dept: FAMILY MEDICINE CLINIC | Facility: CLINIC | Age: 82
End: 2022-10-28

## 2022-10-28 VITALS
HEIGHT: 65 IN | OXYGEN SATURATION: 98 % | SYSTOLIC BLOOD PRESSURE: 154 MMHG | WEIGHT: 138 LBS | HEART RATE: 63 BPM | BODY MASS INDEX: 22.99 KG/M2 | RESPIRATION RATE: 16 BRPM | TEMPERATURE: 98.1 F | DIASTOLIC BLOOD PRESSURE: 80 MMHG

## 2022-10-28 DIAGNOSIS — F43.9 STRESS AT HOME: ICD-10-CM

## 2022-10-28 DIAGNOSIS — F41.9 ANXIETY: ICD-10-CM

## 2022-10-28 DIAGNOSIS — R44.3 HALLUCINATIONS: Primary | ICD-10-CM

## 2022-10-28 DIAGNOSIS — F32.A DEPRESSION, UNSPECIFIED DEPRESSION TYPE: ICD-10-CM

## 2022-10-28 DIAGNOSIS — G47.9 SLEEP DISTURBANCES: ICD-10-CM

## 2022-10-28 PROCEDURE — 99213 OFFICE O/P EST LOW 20 MIN: CPT

## 2022-10-28 NOTE — PROGRESS NOTES
0Chief Complaint  Referral to Psychiatry    Subjective         History of Present Illness    Saima Ornelas 81 y.o. female who presents today for a referral to Psychiatry.    The patient had an appointment on 05/31/2022 reporting hallucinations, increased stress, anxiety, depressed mood, and sleep disturbances.  She reported 20-25 people are following her daily. She reported hearing drones flying over her home and obscure smells in her home.  The patient was referred to Psychiatry at that appointment.    Today, the patient reports ongoing stress, anxiety, and depressed mood due to situations that she is experiencing and feeling like her family does not believe her.  She continues to report people are following her daily, and smelling chemical odors about 1 hour after she falls asleep.  She reports smelling the chemical odors at night has caused her to have trouble sleeping.  She lives in a senior living community.  She is not happy living at the senior community because she says it is dirty and she does not feel accepted there.  She has a caregiver that comes over every night at 9 PM and leaves at 7 AM.  She has reported the chemical odors and reported people following her.  She said the chemical odors were investigated and no signs of chemicals were found.  The patient states she was never contacted to make the appointment for Psychiatry when she was referred at her last appointment.  Will order another referral to Psychiatry today.    She denies suicidal thoughts, suicidal plan, and self injury.      She  denies medication side effects.    Review of Systems   Constitutional: Negative for chills, fatigue and fever.   HENT: Negative for congestion and sinus pressure.    Eyes: Negative for visual disturbance.   Respiratory: Negative for cough, chest tightness and shortness of breath.    Cardiovascular: Negative for chest pain and palpitations.   Gastrointestinal: Negative for abdominal pain, constipation, diarrhea,  "nausea and vomiting.   Genitourinary: Negative for dysuria, frequency and urgency.   Musculoskeletal: Negative for back pain.   Skin: Negative for rash.   Neurological: Positive for confusion. Negative for dizziness, syncope and light-headedness.   Psychiatric/Behavioral: Positive for dysphoric mood, hallucinations and sleep disturbance. Negative for behavioral problems, self-injury and suicidal ideas. The patient is nervous/anxious. The patient is not hyperactive.         Objective   Vital Signs:   /80   Pulse 63   Temp 98.1 °F (36.7 °C)   Resp 16   Ht 166 cm (65.35\")   Wt 62.6 kg (138 lb)   SpO2 98%   BMI 22.72 kg/m²      BMI is within normal parameters. No other follow-up for BMI required.        Physical Exam  Vitals and nursing note reviewed.   Constitutional:       General: She is not in acute distress.     Appearance: Normal appearance. She is well-developed. She is not ill-appearing or toxic-appearing.   HENT:      Head: Normocephalic.      Right Ear: External ear normal.      Left Ear: External ear normal.   Eyes:      General: No scleral icterus.     Pupils: Pupils are equal, round, and reactive to light.   Neck:      Thyroid: No thyromegaly.   Cardiovascular:      Rate and Rhythm: Normal rate and regular rhythm.      Heart sounds: Normal heart sounds.   Pulmonary:      Effort: Pulmonary effort is normal. No respiratory distress.      Breath sounds: Normal breath sounds. No stridor.   Musculoskeletal:         General: No deformity.      Cervical back: Normal range of motion and neck supple.   Skin:     General: Skin is warm.      Coloration: Skin is not jaundiced.   Neurological:      General: No focal deficit present.      Mental Status: She is alert. She is confused.      Cranial Nerves: No dysarthria or facial asymmetry.      Sensory: Sensation is intact.      Motor: No weakness, tremor, atrophy or abnormal muscle tone.      Coordination: Coordination is intact.      Gait: Gait is intact. " Gait normal.   Psychiatric:         Attention and Perception: Attention normal.         Mood and Affect: Mood is anxious and depressed. Affect is not tearful.         Speech: Speech normal. Speech is not slurred.         Behavior: Behavior is cooperative.         Thought Content: Thought content normal. Thought content does not include suicidal ideation. Thought content does not include suicidal plan.         Judgment: Judgment normal.                         Assessment and Plan      Diagnoses and all orders for this visit:    1. Hallucinations (Primary)  -     Ambulatory Referral to Psychiatry    2. Depression, unspecified depression type  -     Ambulatory Referral to Psychiatry    3. Stress at home  -     Ambulatory Referral to Psychiatry    4. Sleep disturbances  -     Ambulatory Referral to Psychiatry    5. Anxiety  -     Ambulatory Referral to Psychiatry            Follow Up     Return if symptoms worsen or fail to improve.    Patient was given instructions and counseling regarding her condition or for health maintenance advice. Please see specific information pulled into the AVS if appropriate.     -Bonds for safety were provided today. The patient was encouraged to seek immediate mental health evaluation at [facility] emergency psychiatric services for worsening depression, suicidal thoughts, suicidal plan, or thoughts of self-harm.   -Referral to Psychiatry for further evaluation.   -Return if symptoms worsen or do not improve.

## 2022-12-26 DIAGNOSIS — D64.9 ANEMIA, UNSPECIFIED TYPE: ICD-10-CM

## 2022-12-27 RX ORDER — FERROUS SULFATE 325(65) MG
TABLET ORAL
Qty: 30 TABLET | Refills: 0 | Status: SHIPPED | OUTPATIENT
Start: 2022-12-27 | End: 2023-01-31

## 2022-12-27 NOTE — TELEPHONE ENCOUNTER
Rx Refill Note  Requested Prescriptions     Pending Prescriptions Disp Refills   • FeroSul 325 (65 Fe) MG tablet [Pharmacy Med Name: FERROUS SULFATE 325MG (5GR) TABS] 30 tablet 0     Sig: TAKE 1 TABLET BY MOUTH TWICE DAILY WITH MEALS      Last office visit with prescribing clinician: 10/28/2022   Last telemedicine visit with prescribing clinician: Visit date not found   Next office visit with prescribing clinician: Visit date not found

## 2023-01-16 ENCOUNTER — LAB (OUTPATIENT)
Dept: OTHER | Facility: HOSPITAL | Age: 83
End: 2023-01-16
Payer: MEDICARE

## 2023-01-16 ENCOUNTER — OFFICE VISIT (OUTPATIENT)
Dept: ONCOLOGY | Facility: CLINIC | Age: 83
End: 2023-01-16
Payer: MEDICARE

## 2023-01-16 VITALS
HEART RATE: 68 BPM | DIASTOLIC BLOOD PRESSURE: 86 MMHG | WEIGHT: 134 LBS | TEMPERATURE: 98 F | OXYGEN SATURATION: 100 % | HEIGHT: 65 IN | RESPIRATION RATE: 16 BRPM | BODY MASS INDEX: 22.33 KG/M2 | SYSTOLIC BLOOD PRESSURE: 155 MMHG

## 2023-01-16 DIAGNOSIS — D64.9 ANEMIA, UNSPECIFIED TYPE: ICD-10-CM

## 2023-01-16 DIAGNOSIS — D47.2 MGUS (MONOCLONAL GAMMOPATHY OF UNKNOWN SIGNIFICANCE): ICD-10-CM

## 2023-01-16 DIAGNOSIS — D50.9 IRON DEFICIENCY ANEMIA, UNSPECIFIED IRON DEFICIENCY ANEMIA TYPE: Primary | ICD-10-CM

## 2023-01-16 LAB
ALBUMIN SERPL-MCNC: 4.5 G/DL (ref 3.5–5.2)
ALBUMIN/GLOB SERPL: 1 G/DL
ALP SERPL-CCNC: 86 U/L (ref 39–117)
ALT SERPL W P-5'-P-CCNC: 20 U/L (ref 1–33)
ANION GAP SERPL CALCULATED.3IONS-SCNC: 8.4 MMOL/L (ref 5–15)
AST SERPL-CCNC: 28 U/L (ref 1–32)
BASOPHILS # BLD AUTO: 0.01 10*3/MM3 (ref 0–0.2)
BASOPHILS NFR BLD AUTO: 0.3 % (ref 0–1.5)
BILIRUB SERPL-MCNC: 0.4 MG/DL (ref 0–1.2)
BUN SERPL-MCNC: 16 MG/DL (ref 8–23)
BUN/CREAT SERPL: 16.5 (ref 7–25)
CALCIUM SPEC-SCNC: 10.2 MG/DL (ref 8.6–10.5)
CHLORIDE SERPL-SCNC: 107 MMOL/L (ref 98–107)
CO2 SERPL-SCNC: 26.6 MMOL/L (ref 22–29)
CREAT SERPL-MCNC: 0.97 MG/DL (ref 0.57–1)
DEPRECATED RDW RBC AUTO: 52.4 FL (ref 37–54)
EGFRCR SERPLBLD CKD-EPI 2021: 58.8 ML/MIN/1.73
EOSINOPHIL # BLD AUTO: 0.04 10*3/MM3 (ref 0–0.4)
EOSINOPHIL NFR BLD AUTO: 1 % (ref 0.3–6.2)
ERYTHROCYTE [DISTWIDTH] IN BLOOD BY AUTOMATED COUNT: 14.6 % (ref 12.3–15.4)
FERRITIN SERPL-MCNC: 126 NG/ML (ref 13–150)
GLOBULIN UR ELPH-MCNC: 4.5 GM/DL
GLUCOSE SERPL-MCNC: 83 MG/DL (ref 65–99)
HCT VFR BLD AUTO: 34.3 % (ref 34–46.6)
HGB BLD-MCNC: 10.7 G/DL (ref 12–15.9)
IMM GRANULOCYTES # BLD AUTO: 0.01 10*3/MM3 (ref 0–0.05)
IMM GRANULOCYTES NFR BLD AUTO: 0.3 % (ref 0–0.5)
IRON 24H UR-MRATE: 91 MCG/DL (ref 37–145)
IRON SATN MFR SERPL: 24 % (ref 20–50)
LYMPHOCYTES # BLD AUTO: 0.76 10*3/MM3 (ref 0.7–3.1)
LYMPHOCYTES NFR BLD AUTO: 19.8 % (ref 19.6–45.3)
MCH RBC QN AUTO: 30.1 PG (ref 26.6–33)
MCHC RBC AUTO-ENTMCNC: 31.2 G/DL (ref 31.5–35.7)
MCV RBC AUTO: 96.6 FL (ref 79–97)
MONOCYTES # BLD AUTO: 0.53 10*3/MM3 (ref 0.1–0.9)
MONOCYTES NFR BLD AUTO: 13.8 % (ref 5–12)
NEUTROPHILS NFR BLD AUTO: 2.48 10*3/MM3 (ref 1.7–7)
NEUTROPHILS NFR BLD AUTO: 64.8 % (ref 42.7–76)
NRBC BLD AUTO-RTO: 0 /100 WBC (ref 0–0.2)
PLATELET # BLD AUTO: 233 10*3/MM3 (ref 140–450)
PMV BLD AUTO: 10 FL (ref 6–12)
POTASSIUM SERPL-SCNC: 4.1 MMOL/L (ref 3.5–5.2)
PROT SERPL-MCNC: 9 G/DL (ref 6–8.5)
RBC # BLD AUTO: 3.55 10*6/MM3 (ref 3.77–5.28)
SODIUM SERPL-SCNC: 142 MMOL/L (ref 136–145)
TIBC SERPL-MCNC: 386 MCG/DL (ref 298–536)
TRANSFERRIN SERPL-MCNC: 259 MG/DL (ref 200–360)
WBC NRBC COR # BLD: 3.83 10*3/MM3 (ref 3.4–10.8)

## 2023-01-16 PROCEDURE — 85025 COMPLETE CBC W/AUTO DIFF WBC: CPT | Performed by: INTERNAL MEDICINE

## 2023-01-16 PROCEDURE — 82728 ASSAY OF FERRITIN: CPT | Performed by: INTERNAL MEDICINE

## 2023-01-16 PROCEDURE — 36415 COLL VENOUS BLD VENIPUNCTURE: CPT

## 2023-01-16 PROCEDURE — 99215 OFFICE O/P EST HI 40 MIN: CPT | Performed by: INTERNAL MEDICINE

## 2023-01-16 PROCEDURE — 86334 IMMUNOFIX E-PHORESIS SERUM: CPT | Performed by: INTERNAL MEDICINE

## 2023-01-16 PROCEDURE — 83521 IG LIGHT CHAINS FREE EACH: CPT | Performed by: INTERNAL MEDICINE

## 2023-01-16 PROCEDURE — 84466 ASSAY OF TRANSFERRIN: CPT | Performed by: INTERNAL MEDICINE

## 2023-01-16 PROCEDURE — 82784 ASSAY IGA/IGD/IGG/IGM EACH: CPT | Performed by: INTERNAL MEDICINE

## 2023-01-16 PROCEDURE — 83540 ASSAY OF IRON: CPT | Performed by: INTERNAL MEDICINE

## 2023-01-16 PROCEDURE — 80053 COMPREHEN METABOLIC PANEL: CPT | Performed by: INTERNAL MEDICINE

## 2023-01-16 PROCEDURE — 84165 PROTEIN E-PHORESIS SERUM: CPT | Performed by: INTERNAL MEDICINE

## 2023-01-16 RX ORDER — AMLODIPINE BESYLATE 5 MG/1
5 TABLET ORAL DAILY
COMMUNITY

## 2023-01-16 RX ORDER — OLANZAPINE 2.5 MG/1
TABLET ORAL
COMMUNITY
Start: 2022-12-29

## 2023-01-16 NOTE — PROGRESS NOTES
[facility]    CONSULTING IN BLOOD DISORDERS & CANCER      REASON FOR CONSULTATION/CHIEF COMPLAINT:     Evaluation & management for anemia                             REQUESTING PHYSICIAN: No ref. provider found  RECORDS OBTAINED:  Records of the patients history including those from the electronic medical record were reviewed and summarized in detail.    HISTORY OF PRESENT ILLNESS:    The patient is a 82 y.o. year old female with past medical history significant for depression, HTN & dyslipidemia who was noted to have low Hb/Hct of 10.3/31.1 on a routine CBC from 1/19/22. WBC, platelets & differential were within normal limits. Additional work up showed no evidence of iron, vitamin B12 or folate deficiency. This prompted referral to this clinic.     Patient was first seen in the hematology clinic on 2/16/22. She reports of fatigue and difficulty with sleep. Her main concern is of unsafe feeling at her assisted living facility. She thinks someone is following her and trying to harm her. She has discussed this matter with her family & staff at the facility.   She denies any bleeding from anywhere. Says her appetite is low and has lost some weight. Has never had a colonoscopy.   On chart review,her hemoglobin appears to be in 9-10 gm/dl range at least since October 2021. No prior records available at this time. Patient had recently moved from [place], [place] to [place], [place].     Work-up performed in this clinic on 2/16/22 show improved Hb to 11.3 gm/dl. Additional labs negative for any hemolysis. EPO level elevated. Peripheral smear review performed by me in clinic shows normocytic RBCs, no increased schistocytes, no blasts or increased immature cells, adequate platelets.    SPEP/HARRISON & FLC show presence of IgG kappa M-protein, 1.6 gm/dl.  FLC ratio mildly elevated at 7.51. No evidence of hypercalcemia, renal dysfunction or any other cytopenias. This is most consistent with MGUS, intermediate risk & unlikely contributing to  anemia.   Her anemia is likely age related bone marrow suppression +/- nutritional intake.     INTERIM HISTORY:  Patient returns to the clinic for a follow-up visit with her daughter.  She denies any new complaints.  She continues to feel very fatigued.  She continues to be stressed about her living situation where she is concerned someone is following her.  She is quite distressed about her not being able to be as active but as when she was working.  She takes iron tablets every day without any adverse effects.    Past Medical History:   Diagnosis Date   • Arthritis    • Depression    • Headache    • Hyperlipidemia    • Hypertension      No past surgical history on file.    MEDICATIONS    Current Outpatient Medications:   •  amLODIPine (NORVASC) 5 MG tablet, Take 5 mg by mouth Daily., Disp: , Rfl:   •  FeroSul 325 (65 Fe) MG tablet, TAKE 1 TABLET BY MOUTH TWICE DAILY WITH MEALS, Disp: 30 tablet, Rfl: 0  •  OLANZapine (zyPREXA) 2.5 MG tablet, , Disp: , Rfl:   •  pravastatin (PRAVACHOL) 80 MG tablet, TAKE 1 TABLET BY MOUTH DAILY, Disp: 90 tablet, Rfl: 1  •  risperiDONE (risperDAL) 0.5 MG tablet, Take 1 tablet by mouth Daily., Disp: 90 tablet, Rfl: 1    ALLERGIES:   No Known Allergies    SOCIAL HISTORY:       Social History     Socioeconomic History   • Marital status:    Tobacco Use   • Smoking status: Never   • Smokeless tobacco: Never   • Tobacco comments:     social   Vaping Use   • Vaping Use: Never used   Substance and Sexual Activity   • Alcohol use: Yes   • Sexual activity: Defer         FAMILY HISTORY:  Family History   Problem Relation Age of Onset   • Breast cancer Neg Hx      Nonpertinent  REVIEW OF SYSTEMS:  Constitutional: [No fevers, chills, sweats, c/o fatigue]  Eye: [No recent visual problems]  ENMT: [No ear pain, nasal congestion, sore throat]  Respiratory: [No shortness of breath, cough]  Cardiovascular: [No Chest pain, palpitations, syncope]  Gastrointestinal: [No nausea, vomiting,  "diarrhea]  Genitourinary: [No hematuria]  Hema/Lymph: [Negative for bruising tendency, swollen lymph glands]  Endocrine: [Negative for excessive thirst, excessive hunger]  Musculoskeletal: [Denies any musculoskeletal pain or swelling]  Integumentary: [No rash, pruritus, abrasions]  Neurologic: [ No weakness or numbness, Alert & awake]         Vitals:    01/16/23 1356   BP: 155/86   Pulse: 68   Resp: 16   Temp: 98 °F (36.7 °C)   TempSrc: Temporal   SpO2: 100%   Weight: 60.8 kg (134 lb)   Height: 166 cm (65.35\")   PainSc: 0-No pain     Current Status 1/16/2023   ECOG score 0      PHYSICAL EXAM:    CONSTITUTIONAL:  Vital signs reviewed.  No distress, looks comfortable.  EYES:  Conjunctiva and lids unremarkable.   EARS,NOSE,MOUTH,THROAT:  Ears and nose appear unremarkable.  RESPIRATORY:  Normal respiratory effort.  Lungs clear to auscultation bilaterally.  CARDIOVASCULAR:  Normal S1, S2.  No murmurs rubs or gallops.  No significant lower extremity edema.  GASTROINTESTINAL: Abdomen appears unremarkable.  Nondistended  LYMPHATIC:  No cervical, supraclavicular lymphadenopathy.  NEURO: AAo x 3, No focal deficits.  Appears to have equal strength all 4 extremities.  MUSCULOSKELETAL:  Unremarkable digits/nails.  No cyanosis or clubbing.  No apparent joint deformities.  SKIN:  Warm.  No rashes.  PSYCHIATRIC:  Has flat affect & appears depressed.      RECENT LABS:        Lab on 01/16/2023   Component Date Value Ref Range Status   • Glucose 01/16/2023 83  65 - 99 mg/dL Final   • BUN 01/16/2023 16  8 - 23 mg/dL Final   • Creatinine 01/16/2023 0.97  0.57 - 1.00 mg/dL Final   • Sodium 01/16/2023 142  136 - 145 mmol/L Final   • Potassium 01/16/2023 4.1  3.5 - 5.2 mmol/L Final   • Chloride 01/16/2023 107  98 - 107 mmol/L Final   • CO2 01/16/2023 26.6  22.0 - 29.0 mmol/L Final   • Calcium 01/16/2023 10.2  8.6 - 10.5 mg/dL Final   • Total Protein 01/16/2023 9.0 (H)  6.0 - 8.5 g/dL Final   • Albumin 01/16/2023 4.5  3.5 - 5.2 g/dL Final " "  • ALT (SGPT) 01/16/2023 20  1 - 33 U/L Final   • AST (SGOT) 01/16/2023 28  1 - 32 U/L Final   • Alkaline Phosphatase 01/16/2023 86  39 - 117 U/L Final   • Total Bilirubin 01/16/2023 0.4  0.0 - 1.2 mg/dL Final   • Globulin 01/16/2023 4.5  gm/dL Final   • A/G Ratio 01/16/2023 1.0  g/dL Final   • BUN/Creatinine Ratio 01/16/2023 16.5  7.0 - 25.0 Final   • Anion Gap 01/16/2023 8.4  5.0 - 15.0 mmol/L Final   • eGFR 01/16/2023 58.8 (L)  >60.0 mL/min/1.73 Final    National Kidney Foundation and American Society of Nephrology (ASN) Task Force recommended calculation based on the Chronic Kidney Disease Epidemiology Collaboration (CKD-EPI) equation refit without adjustment for race.   • IgG 01/16/2023 2636 (H)  586 - 1602 mg/dL Final   • IgA 01/16/2023 145  64 - 422 mg/dL Final   • IgM 01/16/2023 17 (L)  26 - 217 mg/dL Final    Result confirmed on concentration.   • Total Protein 01/16/2023 8.4  6.0 - 8.5 g/dL Final   • Albumin 01/16/2023 4.1  2.9 - 4.4 g/dL Final   • Alpha-1-Globulin 01/16/2023 0.3  0.0 - 0.4 g/dL Final   • Alpha-2-Globulin 01/16/2023 0.7  0.4 - 1.0 g/dL Final   • Beta Globulin 01/16/2023 1.2  0.7 - 1.3 g/dL Final   • Gamma Globulin 01/16/2023 2.1 (H)  0.4 - 1.8 g/dL Final   • M-Nate 01/16/2023 1.8 (H)  Not Observed g/dL Final   • Globulin 01/16/2023 4.3 (H)  2.2 - 3.9 g/dL Final   • A/G Ratio 01/16/2023 1.0  0.7 - 1.7 Final   • Immunofixation Reflex, Serum 01/16/2023 Comment (A)   Final    Immunofixation shows IgG monoclonal protein with kappa light chain  specificity.  Please note that samples from patients receiving DARZALEX(R)  (daratumumab) or SARCLISA(R)(isatuximab-irfc) treatment can appear  as an \"IgG kappa\" and mask a complete response (CR). If this  patient is receiving these therapies, this HARRISON assay interference  can be removed by ordering test number [id number]-\"Immunofixation,  Daratumumab-Specific, Serum\" or [id number]-\"Immunofixation,  Isatuximab-Specific, Serum\" and submitting a new sample " for  testing or by calling the lab to add this test to the current  sample.   • Please note 01/16/2023 Comment   Final    Protein electrophoresis scan will follow via computer, mail, or   delivery.   • Free Light Chain, Kappa 01/16/2023 127.3 (H)  3.3 - 19.4 mg/L Final   • Free Lambda Light Chains 01/16/2023 11.7  5.7 - 26.3 mg/L Final   • Kappa/Lambda Ratio 01/16/2023 10.88 (H)  0.26 - 1.65 Final   • Ferritin 01/16/2023 126.00  13.00 - 150.00 ng/mL Final   • Iron 01/16/2023 91  37 - 145 mcg/dL Final   • Iron Saturation 01/16/2023 24  20 - 50 % Final   • Transferrin 01/16/2023 259  200 - 360 mg/dL Final   • TIBC 01/16/2023 386  298 - 536 mcg/dL Final   • WBC 01/16/2023 3.83  3.40 - 10.80 10*3/mm3 Final   • RBC 01/16/2023 3.55 (L)  3.77 - 5.28 10*6/mm3 Final   • Hemoglobin 01/16/2023 10.7 (L)  12.0 - 15.9 g/dL Final   • Hematocrit 01/16/2023 34.3  34.0 - 46.6 % Final   • MCV 01/16/2023 96.6  79.0 - 97.0 fL Final   • MCH 01/16/2023 30.1  26.6 - 33.0 pg Final   • MCHC 01/16/2023 31.2 (L)  31.5 - 35.7 g/dL Final   • RDW 01/16/2023 14.6  12.3 - 15.4 % Final   • RDW-SD 01/16/2023 52.4  37.0 - 54.0 fl Final   • MPV 01/16/2023 10.0  6.0 - 12.0 fL Final   • Platelets 01/16/2023 233  140 - 450 10*3/mm3 Final   • Neutrophil % 01/16/2023 64.8  42.7 - 76.0 % Final   • Lymphocyte % 01/16/2023 19.8  19.6 - 45.3 % Final   • Monocyte % 01/16/2023 13.8 (H)  5.0 - 12.0 % Final   • Eosinophil % 01/16/2023 1.0  0.3 - 6.2 % Final   • Basophil % 01/16/2023 0.3  0.0 - 1.5 % Final   • Immature Grans % 01/16/2023 0.3  0.0 - 0.5 % Final   • Neutrophils, Absolute 01/16/2023 2.48  1.70 - 7.00 10*3/mm3 Final   • Lymphocytes, Absolute 01/16/2023 0.76  0.70 - 3.10 10*3/mm3 Final   • Monocytes, Absolute 01/16/2023 0.53  0.10 - 0.90 10*3/mm3 Final   • Eosinophils, Absolute 01/16/2023 0.04  0.00 - 0.40 10*3/mm3 Final   • Basophils, Absolute 01/16/2023 0.01  0.00 - 0.20 10*3/mm3 Final   • Immature Grans, Absolute 01/16/2023 0.01  0.00 - 0.05  10*3/mm3 Final   • nRBC 01/16/2023 0.0  0.0 - 0.2 /100 WBC Final         ASSESSMENT:   is a 81 y/o AAF with past medical history significant for depression, HTN & dyslipidemia who comes for anemia evaluation & management.     # Normocytic anemia:  · Patient was noted to have low Hb/Hct of 10.3/31.1 on a routine CBC from 1/19/22. WBC, platelets & differential were within normal limits. Additional work up showed no evidence of iron, vitamin B12 or folate deficiency. This prompted referral to this clinic.   · On chart review,her hemoglobin appears to be in 9-10 gm/dl range at least since October 2021. No prior records available at this time. Patient had recently moved from [place], [place] to [place], [place].   · Other than fatigue & possible hallucinations, she denies any major symptoms. Has poor appetite. Denies bleeding from anywhere.   · Work up performed in this clinic on 2/16/22 show improved Hb to 11.3 gm/dl. Additional labs negative for any hemolysis. EPO level elevated. Peripheral smear review performed by me in clinic shows normocytic RBCs, no increased schistocytes, no blasts or increased immature cells, adequate platelets.    · SPEP/HARRISON & FLC show presence of IgG kappa M-protein, 1.6 gm/dl.  FLC ratio mildly elevated at 7.51. No evidence of hypercalcemia, renal dysfunction or any other cytopenias. This is most consistent with MGUS, intermediate risk & unlikely contributing to anemia.   · Her anemia is likely age related bone marrow suppression +/- nutritional intake.   · CBC from 7/11/2022 shows overall stable hemoglobin at 10.3.  · CBC from 1/16/2023 shows overall stable hemoglobin at 10.7.  · Encouraged to increase PO intake and continue PO Iron supplements.   · Will plan to monitor for now & repeat labs in 6 months.     # MGUS, low risk:  · The SPEP/HARRISON & FLC from 2/16/22 show presence of IgG kappa M-protein, 1.6 gm/dl.  FLC ratio mildly elevated at 7.51. No evidence of hypercalcemia, renal dysfunction or  any other cytopenias.  · This is most consistent with MGUS, intermediate risk & unlikely contributing to anemia.   · Repeat SPEP/HARRISON from January 2023 shows slight increase in M protein to 1.8.  · Plan to monitor & repeat labs at follow-up in 6 months    # Depressions and suspected hallucinations:  · She reports feeling unsafe at her assisted living facility. She thinks someone is following her and trying to harm her. She has discussed this matter with her family & staff at the facility.   · Advised to continue to discuss with her family/friends.  She has been referred to psychiatry by her PCP.    # Hypertension:  BP better controlled now  Asymptomatic.   F/u with PCP    PLAN:   - Work up performed in this clinic & so far negative for iron, B12 & folate deficiency. Negative for any hemolysis. EPO level elevated. CBC show stable Hb to 10.3 gm/dl. - SPEP/HARRISON most consistent with MGUS, intermediate risk & unlikely contributing to anemia.   - Her anemia is likely age related bone marrow suppression +/- nutritional intake.   -Hemoglobin overall stable at 10.7 today.  - Encouraged to increase PO intake and continue PO Iron supplements.   - Will plan to monitor for now & repeat labs in 6 months.   -Advised close follow-up with PCP and psychiatry regarding other chronic medical issues.    Orders Placed This Encounter   Procedures   • Comprehensive Metabolic Panel     Standing Status:   Future     Standing Expiration Date:   3/25/2024     Order Specific Question:   Release to patient     Answer:   Routine Release   • HARRISON,PE and FLC, Serum     Standing Status:   Future     Standing Expiration Date:   3/25/2024     Order Specific Question:   Release to patient     Answer:   Routine Release   • CBC & Differential     Standing Status:   Future     Standing Expiration Date:   3/25/2024     Order Specific Question:   Manual Differential     Answer:   No   Total time spent during this patient encounter is 45 minutes. The total time  spent with the patient includes at least one or more of the following: preparing to see the patient by reviewing of tests, prior notes or other relevant information, performing appropriate independent examination & evaluation, counseling, ordering of medications, tests or procedures, communicating with other healthcare professionals, when appropriate to coordinate care, documenting clinic information in the electronic medical records or other health records, independently interpreting results of tests and communicating the results to the patient/family or caregiver.

## 2023-01-18 LAB
ALBUMIN SERPL ELPH-MCNC: 4.1 G/DL (ref 2.9–4.4)
ALBUMIN/GLOB SERPL: 1 {RATIO} (ref 0.7–1.7)
ALPHA1 GLOB SERPL ELPH-MCNC: 0.3 G/DL (ref 0–0.4)
ALPHA2 GLOB SERPL ELPH-MCNC: 0.7 G/DL (ref 0.4–1)
B-GLOBULIN SERPL ELPH-MCNC: 1.2 G/DL (ref 0.7–1.3)
GAMMA GLOB SERPL ELPH-MCNC: 2.1 G/DL (ref 0.4–1.8)
GLOBULIN SER-MCNC: 4.3 G/DL (ref 2.2–3.9)
IGA SERPL-MCNC: 145 MG/DL (ref 64–422)
IGG SERPL-MCNC: 2636 MG/DL (ref 586–1602)
IGM SERPL-MCNC: 17 MG/DL (ref 26–217)
INTERPRETATION SERPL IEP-IMP: ABNORMAL
KAPPA LC FREE SER-MCNC: 127.3 MG/L (ref 3.3–19.4)
KAPPA LC FREE/LAMBDA FREE SER: 10.88 {RATIO} (ref 0.26–1.65)
LABORATORY COMMENT REPORT: ABNORMAL
LAMBDA LC FREE SERPL-MCNC: 11.7 MG/L (ref 5.7–26.3)
M PROTEIN SERPL ELPH-MCNC: 1.8 G/DL
PROT SERPL-MCNC: 8.4 G/DL (ref 6–8.5)

## 2023-01-20 ENCOUNTER — TELEPHONE (OUTPATIENT)
Dept: ONCOLOGY | Facility: CLINIC | Age: 83
End: 2023-01-20
Payer: MEDICARE

## 2023-01-20 NOTE — TELEPHONE ENCOUNTER
----- Message from Nicholas Jules MD sent at 1/20/2023  8:51 AM EST -----  I had seen this patient this Monday and wanted to see her back in 1 years time.  However, based on her lab test results I would like to see her back in 6 months instead.    Can you please change her appointment to 6 months and inform patient?    Thanks,

## 2023-01-31 DIAGNOSIS — D64.9 ANEMIA, UNSPECIFIED TYPE: ICD-10-CM

## 2023-01-31 RX ORDER — FERROUS SULFATE 325(65) MG
TABLET ORAL
Qty: 180 TABLET | Refills: 0 | Status: SHIPPED | OUTPATIENT
Start: 2023-01-31

## 2023-01-31 NOTE — TELEPHONE ENCOUNTER
Rx Refill Note  Requested Prescriptions     Pending Prescriptions Disp Refills   • FeroSul 325 (65 Fe) MG tablet [Pharmacy Med Name: FERROUS SULFATE 325MG (5GR) TABS] 30 tablet 0     Sig: TAKE 1 TABLET BY MOUTH TWICE DAILY WITH MEALS      Last office visit with prescribing clinician: 10/28/2022   Last telemedicine visit with prescribing clinician: Visit date not found   Next office visit with prescribing clinician: Visit date not found                         Would you like a call back once the refill request has been completed: [] Yes [] No    If the office needs to give you a call back, can they leave a voicemail: [] Yes [] No    Nam Stanford MA  01/31/23, 13:26 EST

## 2023-02-01 ENCOUNTER — TELEPHONE (OUTPATIENT)
Dept: CASE MANAGEMENT | Facility: OTHER | Age: 83
End: 2023-02-01

## 2023-02-03 ENCOUNTER — TELEPHONE (OUTPATIENT)
Dept: CASE MANAGEMENT | Facility: OTHER | Age: 83
End: 2023-02-03
Payer: MEDICARE

## 2023-02-03 NOTE — TELEPHONE ENCOUNTER
[facility]. Spoke with male family member, pt is currently in an appointment right now. Will call back around 1:30 PM.

## 2023-04-27 ENCOUNTER — TELEPHONE (OUTPATIENT)
Dept: ONCOLOGY | Facility: CLINIC | Age: 83
End: 2023-04-27
Payer: MEDICARE

## 2023-04-27 DIAGNOSIS — D50.9 IRON DEFICIENCY ANEMIA, UNSPECIFIED IRON DEFICIENCY ANEMIA TYPE: Primary | ICD-10-CM

## 2023-04-27 NOTE — TELEPHONE ENCOUNTER
Called back Ms Boyd, she stated that patient asked her to call due to patient being cold and tired. Discussed with Nichole LAZO, to have blood counts checked: cbc, iron studies, ferritin and RN review lasr Tena called back and made aware, patient was on phone as well and is amenable. They prefer earliest or latest appt, or after 2:30pm. Coordinating with .

## 2023-04-27 NOTE — TELEPHONE ENCOUNTER
Caller: ELOISA ORTIZ    Relationship: Emergency Contact    Best call back number: [phone]    What is the best time to reach you: ANY    Who are you requesting to speak with (clinical staff, provider,  specific staff member): CLINICAL    What was the call regarding: ELOISA IS CALLING STATES THAT NAI IS STAYING COLD AND WANTED TO COME IN TO BE SEEN    ALSO WANTS TO DISCUSS RECOMMENDATIONS FOLLOWING HER LAST MAMMOGRAM    Do you require a callback: YES

## 2023-04-28 ENCOUNTER — CLINICAL SUPPORT (OUTPATIENT)
Dept: ONCOLOGY | Facility: HOSPITAL | Age: 83
End: 2023-04-28
Payer: MEDICARE

## 2023-04-28 ENCOUNTER — LAB (OUTPATIENT)
Dept: LAB | Facility: HOSPITAL | Age: 83
End: 2023-04-28
Payer: MEDICARE

## 2023-04-28 DIAGNOSIS — D50.9 IRON DEFICIENCY ANEMIA, UNSPECIFIED IRON DEFICIENCY ANEMIA TYPE: ICD-10-CM

## 2023-04-28 LAB
BASOPHILS # BLD AUTO: 0.02 10*3/MM3 (ref 0–0.2)
BASOPHILS NFR BLD AUTO: 0.4 % (ref 0–1.5)
DEPRECATED RDW RBC AUTO: 53.5 FL (ref 37–54)
EOSINOPHIL # BLD AUTO: 0.03 10*3/MM3 (ref 0–0.4)
EOSINOPHIL NFR BLD AUTO: 0.7 % (ref 0.3–6.2)
ERYTHROCYTE [DISTWIDTH] IN BLOOD BY AUTOMATED COUNT: 14.6 % (ref 12.3–15.4)
FERRITIN SERPL-MCNC: 91.4 NG/ML (ref 13–150)
HCT VFR BLD AUTO: 34.2 % (ref 34–46.6)
HGB BLD-MCNC: 10.6 G/DL (ref 12–15.9)
IMM GRANULOCYTES # BLD AUTO: 0.02 10*3/MM3 (ref 0–0.05)
IMM GRANULOCYTES NFR BLD AUTO: 0.4 % (ref 0–0.5)
IRON 24H UR-MRATE: 51 MCG/DL (ref 37–145)
IRON SATN MFR SERPL: 13 % (ref 14–48)
LYMPHOCYTES # BLD AUTO: 0.74 10*3/MM3 (ref 0.7–3.1)
LYMPHOCYTES NFR BLD AUTO: 16.4 % (ref 19.6–45.3)
MCH RBC QN AUTO: 30.8 PG (ref 26.6–33)
MCHC RBC AUTO-ENTMCNC: 31 G/DL (ref 31.5–35.7)
MCV RBC AUTO: 99.4 FL (ref 79–97)
MONOCYTES # BLD AUTO: 0.48 10*3/MM3 (ref 0.1–0.9)
MONOCYTES NFR BLD AUTO: 10.6 % (ref 5–12)
NEUTROPHILS NFR BLD AUTO: 3.23 10*3/MM3 (ref 1.7–7)
NEUTROPHILS NFR BLD AUTO: 71.5 % (ref 42.7–76)
NRBC BLD AUTO-RTO: 0 /100 WBC (ref 0–0.2)
PLATELET # BLD AUTO: 235 10*3/MM3 (ref 140–450)
PMV BLD AUTO: 9.9 FL (ref 6–12)
RBC # BLD AUTO: 3.44 10*6/MM3 (ref 3.77–5.28)
TIBC SERPL-MCNC: 400 MCG/DL (ref 249–505)
TRANSFERRIN SERPL-MCNC: 286 MG/DL (ref 200–360)
WBC NRBC COR # BLD: 4.52 10*3/MM3 (ref 3.4–10.8)

## 2023-04-28 PROCEDURE — 85025 COMPLETE CBC W/AUTO DIFF WBC: CPT

## 2023-04-28 PROCEDURE — 82728 ASSAY OF FERRITIN: CPT

## 2023-04-28 PROCEDURE — 36415 COLL VENOUS BLD VENIPUNCTURE: CPT

## 2023-04-28 PROCEDURE — G0463 HOSPITAL OUTPT CLINIC VISIT: HCPCS

## 2023-04-28 PROCEDURE — 84466 ASSAY OF TRANSFERRIN: CPT

## 2023-04-28 PROCEDURE — 83540 ASSAY OF IRON: CPT

## 2023-04-28 NOTE — NURSING NOTE
Pt here for RN review to check iron labs. Iron sat of 13% and ferritin of 91.40.  Pt reports that she is still taking oral iron supplement, but has been very fatigued and cold.  Message sent to Dr. Jules for instructions and notified pt that it will likely be Monday before I will be able to pass that on to her.  We did discuss the possibility of iron infusions and what that would entail if approved by insurance.  Pt v/u.

## 2023-05-02 ENCOUNTER — TELEPHONE (OUTPATIENT)
Dept: ONCOLOGY | Facility: CLINIC | Age: 83
End: 2023-05-02
Payer: MEDICARE

## 2023-05-02 NOTE — TELEPHONE ENCOUNTER
Discussed with Dr Jules, patient to take iron supplements twice a day, [facility] but [facility]

## 2023-05-03 ENCOUNTER — PATIENT MESSAGE (OUTPATIENT)
Dept: ONCOLOGY | Facility: CLINIC | Age: 83
End: 2023-05-03
Payer: MEDICARE

## 2023-05-09 ENCOUNTER — TELEPHONE (OUTPATIENT)
Dept: FAMILY MEDICINE CLINIC | Facility: CLINIC | Age: 83
End: 2023-05-09
Payer: MEDICARE

## 2023-05-09 DIAGNOSIS — D64.9 ANEMIA, UNSPECIFIED TYPE: ICD-10-CM

## 2023-05-09 RX ORDER — FERROUS SULFATE 325(65) MG
1 TABLET ORAL 2 TIMES DAILY WITH MEALS
Qty: 180 TABLET | Refills: 0 | OUTPATIENT
Start: 2023-05-09

## 2023-05-09 NOTE — TELEPHONE ENCOUNTER
Caller: Saima rOnelas    Relationship: Self    Best call back number: [phone]    What was the call regarding: PATIENT STATED THAT SHE IS WAITING TO RECEIVE HER TEST RESULTS FROM HER BLOOD WORK DONE TWO WEEKS AGO. PATIENT STATED THAT SHE FEELS HER CONDITION IS GETTING WORSE BECAUSE SHE IS FREEZING AND SHE REALLY NEEDS HER TEST RESULTS. PLEASE ADVISE.    Do you require a callback: YES

## 2023-05-09 NOTE — TELEPHONE ENCOUNTER
Rx Refill Note  Requested Prescriptions     Pending Prescriptions Disp Refills   • ferrous sulfate (FeroSul) 325 (65 FE) MG tablet 180 tablet 0     Sig: Take 1 tablet by mouth 2 (Two) Times a Day With Meals.      Last office visit with prescribing clinician: 10/28/2022   Last telemedicine visit with prescribing clinician: 1/31/2023   Next office visit with prescribing clinician: 5/9/2023   {

## 2023-05-09 NOTE — TELEPHONE ENCOUNTER
Spoke with pt gave her swathi melendrez contact information for the lab results she requested she needed.[phone]

## 2023-05-09 NOTE — TELEPHONE ENCOUNTER
Caller: Saima Ornelas    Relationship: Self    Best call back number: [phone]    Requested Prescriptions:   Requested Prescriptions     Pending Prescriptions Disp Refills   • ferrous sulfate (FeroSul) 325 (65 FE) MG tablet 180 tablet 0     Sig: Take 1 tablet by mouth 2 (Two) Times a Day With Meals.        Pharmacy where request should be sent: [facility][id number] [address] AT [place] [phone] [place] [phone]      Last office visit with prescribing clinician: 10/28/2022   Last telemedicine visit with prescribing clinician: 1/31/2023   Next office visit with prescribing clinician: Visit date not found     Additional details provided by patient:     Does the patient have less than a 3 day supply:  [x] Yes  [] No    Would you like a call back once the refill request has been completed: [] Yes [x] No    If the office needs to give you a call back, can they leave a voicemail: [] Yes [x] No    Patsy Pritchard Rep   05/09/23 08:52 EDT

## 2023-05-09 NOTE — TELEPHONE ENCOUNTER
Spoke to pt I let her know ana campbell message on the 4th stating another office took her labs and they will give her results to her. Gave pt information and she is very confused on what I am telling her pt wants to know what she needs to do please advise thanks

## 2023-05-10 NOTE — TELEPHONE ENCOUNTER
No reply on MyChart, tried to call patient again, no response, [facility] [phone].    05/12/23 Called patient but still no answer, left voicemail. Advised to call back for any concerns and if symptoms is unresolved.

## 2023-08-07 ENCOUNTER — OFFICE VISIT (OUTPATIENT)
Dept: ONCOLOGY | Facility: CLINIC | Age: 83
End: 2023-08-07
Payer: MEDICARE

## 2023-08-07 ENCOUNTER — LAB (OUTPATIENT)
Dept: LAB | Facility: HOSPITAL | Age: 83
End: 2023-08-07
Payer: MEDICARE

## 2023-08-07 VITALS
SYSTOLIC BLOOD PRESSURE: 166 MMHG | RESPIRATION RATE: 16 BRPM | HEIGHT: 65 IN | OXYGEN SATURATION: 100 % | DIASTOLIC BLOOD PRESSURE: 98 MMHG | BODY MASS INDEX: 22.67 KG/M2 | TEMPERATURE: 98.6 F | WEIGHT: 136.1 LBS | HEART RATE: 69 BPM

## 2023-08-07 DIAGNOSIS — R68.89 COLD SENSITIVITY: ICD-10-CM

## 2023-08-07 DIAGNOSIS — D50.9 IRON DEFICIENCY ANEMIA, UNSPECIFIED IRON DEFICIENCY ANEMIA TYPE: ICD-10-CM

## 2023-08-07 DIAGNOSIS — D47.2 MGUS (MONOCLONAL GAMMOPATHY OF UNKNOWN SIGNIFICANCE): ICD-10-CM

## 2023-08-07 DIAGNOSIS — F41.1 GENERALIZED ANXIETY DISORDER: ICD-10-CM

## 2023-08-07 DIAGNOSIS — D50.9 IRON DEFICIENCY ANEMIA, UNSPECIFIED IRON DEFICIENCY ANEMIA TYPE: Primary | ICD-10-CM

## 2023-08-07 LAB
ALBUMIN SERPL-MCNC: 3.9 G/DL (ref 3.5–5.2)
ALBUMIN/GLOB SERPL: 1 G/DL
ALP SERPL-CCNC: 76 U/L (ref 39–117)
ALT SERPL W P-5'-P-CCNC: 30 U/L (ref 1–33)
ANION GAP SERPL CALCULATED.3IONS-SCNC: 12.7 MMOL/L (ref 5–15)
AST SERPL-CCNC: 34 U/L (ref 1–32)
BASOPHILS # BLD AUTO: 0.02 10*3/MM3 (ref 0–0.2)
BASOPHILS NFR BLD AUTO: 0.5 % (ref 0–1.5)
BILIRUB SERPL-MCNC: 0.3 MG/DL (ref 0–1.2)
BUN SERPL-MCNC: 20 MG/DL (ref 8–23)
BUN/CREAT SERPL: 23.5 (ref 7–25)
CALCIUM SPEC-SCNC: 9.4 MG/DL (ref 8.6–10.5)
CHLORIDE SERPL-SCNC: 107 MMOL/L (ref 98–107)
CO2 SERPL-SCNC: 23.3 MMOL/L (ref 22–29)
CREAT SERPL-MCNC: 0.85 MG/DL (ref 0.6–1.1)
DEPRECATED RDW RBC AUTO: 56.4 FL (ref 37–54)
EGFRCR SERPLBLD CKD-EPI 2021: 68.9 ML/MIN/1.73
EOSINOPHIL # BLD AUTO: 0 10*3/MM3 (ref 0–0.4)
EOSINOPHIL NFR BLD AUTO: 0 % (ref 0.3–6.2)
ERYTHROCYTE [DISTWIDTH] IN BLOOD BY AUTOMATED COUNT: 15.6 % (ref 12.3–15.4)
FERRITIN SERPL-MCNC: 107 NG/ML (ref 13–150)
GLOBULIN UR ELPH-MCNC: 3.8 GM/DL
GLUCOSE SERPL-MCNC: 73 MG/DL (ref 65–99)
HCT VFR BLD AUTO: 33 % (ref 34–46.6)
HGB BLD-MCNC: 10.4 G/DL (ref 12–15.9)
IMM GRANULOCYTES # BLD AUTO: 0.01 10*3/MM3 (ref 0–0.05)
IMM GRANULOCYTES NFR BLD AUTO: 0.2 % (ref 0–0.5)
IRON 24H UR-MRATE: 59 MCG/DL (ref 37–145)
IRON SATN MFR SERPL: 17 % (ref 20–50)
LYMPHOCYTES # BLD AUTO: 0.66 10*3/MM3 (ref 0.7–3.1)
LYMPHOCYTES NFR BLD AUTO: 16.3 % (ref 19.6–45.3)
MCH RBC QN AUTO: 31 PG (ref 26.6–33)
MCHC RBC AUTO-ENTMCNC: 31.5 G/DL (ref 31.5–35.7)
MCV RBC AUTO: 98.2 FL (ref 79–97)
MONOCYTES # BLD AUTO: 0.42 10*3/MM3 (ref 0.1–0.9)
MONOCYTES NFR BLD AUTO: 10.4 % (ref 5–12)
NEUTROPHILS NFR BLD AUTO: 2.93 10*3/MM3 (ref 1.7–7)
NEUTROPHILS NFR BLD AUTO: 72.6 % (ref 42.7–76)
NRBC BLD AUTO-RTO: 0 /100 WBC (ref 0–0.2)
PLATELET # BLD AUTO: 198 10*3/MM3 (ref 140–450)
PMV BLD AUTO: 9.5 FL (ref 6–12)
POTASSIUM SERPL-SCNC: 4.1 MMOL/L (ref 3.5–5.2)
PROT SERPL-MCNC: 7.7 G/DL (ref 6–8.5)
RBC # BLD AUTO: 3.36 10*6/MM3 (ref 3.77–5.28)
SODIUM SERPL-SCNC: 143 MMOL/L (ref 136–145)
T4 FREE SERPL-MCNC: 1.06 NG/DL (ref 0.93–1.7)
TIBC SERPL-MCNC: 344 MCG/DL (ref 298–536)
TRANSFERRIN SERPL-MCNC: 246 MG/DL (ref 200–360)
TSH SERPL DL<=0.05 MIU/L-ACNC: 1.65 UIU/ML (ref 0.27–4.2)
WBC NRBC COR # BLD: 4.04 10*3/MM3 (ref 3.4–10.8)

## 2023-08-07 PROCEDURE — 84439 ASSAY OF FREE THYROXINE: CPT | Performed by: INTERNAL MEDICINE

## 2023-08-07 PROCEDURE — 84443 ASSAY THYROID STIM HORMONE: CPT | Performed by: INTERNAL MEDICINE

## 2023-08-07 PROCEDURE — 82728 ASSAY OF FERRITIN: CPT

## 2023-08-07 PROCEDURE — 85025 COMPLETE CBC W/AUTO DIFF WBC: CPT

## 2023-08-07 PROCEDURE — 36415 COLL VENOUS BLD VENIPUNCTURE: CPT

## 2023-08-07 PROCEDURE — 80053 COMPREHEN METABOLIC PANEL: CPT

## 2023-08-07 PROCEDURE — 83540 ASSAY OF IRON: CPT

## 2023-08-07 PROCEDURE — 84466 ASSAY OF TRANSFERRIN: CPT

## 2023-08-07 RX ORDER — ASPIRIN 81 MG/1
TABLET ORAL
COMMUNITY

## 2023-08-07 NOTE — PROGRESS NOTES
[facility]    CONSULTING IN BLOOD DISORDERS & CANCER      REASON FOR CONSULTATION/CHIEF COMPLAINT:     Evaluation & management for anemia                             REQUESTING PHYSICIAN: No ref. provider found  RECORDS OBTAINED:  Records of the patients history including those from the electronic medical record were reviewed and summarized in detail.    HISTORY OF PRESENT ILLNESS:    The patient is a 82 y.o. year old female with past medical history significant for depression, HTN & dyslipidemia who was noted to have low Hb/Hct of 10.3/31.1 on a routine CBC from 1/19/22. WBC, platelets & differential were within normal limits. Additional work up showed no evidence of iron, vitamin B12 or folate deficiency. This prompted referral to this clinic.     Patient was first seen in the hematology clinic on 2/16/22. She reports of fatigue and difficulty with sleep. Her main concern is of unsafe feeling at her assisted living facility. She thinks someone is following her and trying to harm her. She has discussed this matter with her family & staff at the facility.   She denies any bleeding from anywhere. Says her appetite is low and has lost some weight. Has never had a colonoscopy.   On chart review,her hemoglobin appears to be in 9-10 gm/dl range at least since October 2021. No prior records available at this time. Patient had recently moved from [place], [place] to [place], [place].     Work-up performed in this clinic on 2/16/22 show improved Hb to 11.3 gm/dl. Additional labs negative for any hemolysis. EPO level elevated. Peripheral smear review performed by me in clinic shows normocytic RBCs, no increased schistocytes, no blasts or increased immature cells, adequate platelets.    SPEP/HARRISON & FLC show presence of IgG kappa M-protein, 1.6 gm/dl.  FLC ratio mildly elevated at 7.51. No evidence of hypercalcemia, renal dysfunction or any other cytopenias. This is most consistent with MGUS, intermediate risk & unlikely contributing to  anemia.   Her anemia is likely age related bone marrow suppression +/- nutritional intake.     INTERIM HISTORY:  Patient returns to the clinic for a follow-up visit with her daughter.  She denies any new complaints.  She continues to feel very fatigued and cold all the time.  She continues to be stressed about her living situation where she is concerned someone is following her.  She is unable to change her living facility due to living with 2 large dogs and a cat.  She is quite distressed about her not being able to be as active but as when she was working.  She takes iron tablets twice A day without any adverse effects.    Past Medical History:   Diagnosis Date    Arthritis     Depression     Headache     Hyperlipidemia     Hypertension      History reviewed. No pertinent surgical history.    MEDICATIONS    Current Outpatient Medications:     amLODIPine (NORVASC) 5 MG tablet, Take 2 tablets by mouth Daily., Disp: , Rfl:     aspirin 81 MG EC tablet, Take 1 tablet every day by oral route., Disp: , Rfl:     FeroSul 325 (65 Fe) MG tablet, TAKE 1 TABLET BY MOUTH TWICE DAILY WITH MEALS, Disp: 180 tablet, Rfl: 0    OLANZapine (zyPREXA) 2.5 MG tablet, , Disp: , Rfl:     pravastatin (PRAVACHOL) 80 MG tablet, TAKE 1 TABLET BY MOUTH DAILY, Disp: 90 tablet, Rfl: 1    ALLERGIES:   No Known Allergies    SOCIAL HISTORY:       Social History     Socioeconomic History    Marital status:    Tobacco Use    Smoking status: Never    Smokeless tobacco: Never    Tobacco comments:     social   Vaping Use    Vaping Use: Never used   Substance and Sexual Activity    Alcohol use: Yes    Sexual activity: Defer         FAMILY HISTORY:  Family History   Problem Relation Age of Onset    Breast cancer Neg Hx      Nonpertinent  REVIEW OF SYSTEMS:  As per HPI         Vitals:    08/07/23 1335   BP: 166/98   Pulse: 69   Resp: 16   Temp: 98.6 øF (37 øC)   TempSrc: Infrared   SpO2: 100%   Weight: 61.7 kg (136 lb 1.6 oz)   Height: 166 cm  "(65.35\")   PainSc: 0-No pain         8/7/2023     1:40 PM   Current Status   ECOG score 0      PHYSICAL EXAM:    CONSTITUTIONAL:  Vital signs reviewed.  No distress, looks comfortable.  EYES:  Conjunctiva and lids unremarkable.   EARS,NOSE,MOUTH,THROAT:  Ears and nose appear unremarkable.  RESPIRATORY:  Normal respiratory effort.  Lungs clear to auscultation bilaterally.  CARDIOVASCULAR:  Normal S1, S2.  No murmurs rubs or gallops.  No significant lower extremity edema.  GASTROINTESTINAL: Abdomen appears unremarkable.  Nondistended  LYMPHATIC:  No cervical, supraclavicular lymphadenopathy.  NEURO: AAo x 3, No focal deficits.  Appears to have equal strength all 4 extremities.  MUSCULOSKELETAL:  Unremarkable digits/nails.  No cyanosis or clubbing.  No apparent joint deformities.  SKIN:  Warm.  No rashes.  PSYCHIATRIC:  Has flat affect & appears depressed.      RECENT LABS:        Lab on 08/07/2023   Component Date Value Ref Range Status    Glucose 08/07/2023 73  65 - 99 mg/dL Final    BUN 08/07/2023 20  8 - 23 mg/dL Final    Creatinine 08/07/2023 0.85  0.60 - 1.10 mg/dL Final    Sodium 08/07/2023 143  136 - 145 mmol/L Final    Potassium 08/07/2023 4.1  3.5 - 5.2 mmol/L Final    Chloride 08/07/2023 107  98 - 107 mmol/L Final    CO2 08/07/2023 23.3  22.0 - 29.0 mmol/L Final    Calcium 08/07/2023 9.4  8.6 - 10.5 mg/dL Final    Total Protein 08/07/2023 7.7  6.0 - 8.5 g/dL Final    Albumin 08/07/2023 3.9  3.5 - 5.2 g/dL Final    ALT (SGPT) 08/07/2023 30  1 - 33 U/L Final    AST (SGOT) 08/07/2023 34 (H)  1 - 32 U/L Final    Alkaline Phosphatase 08/07/2023 76  39 - 117 U/L Final    Total Bilirubin 08/07/2023 0.3  0.0 - 1.2 mg/dL Final    Globulin 08/07/2023 3.8  gm/dL Final    A/G Ratio 08/07/2023 1.0  g/dL Final    BUN/Creatinine Ratio 08/07/2023 23.5  7.0 - 25.0 Final    Anion Gap 08/07/2023 12.7  5.0 - 15.0 mmol/L Final    eGFR 08/07/2023 68.9  >60.0 mL/min/1.73 Final    Iron 08/07/2023 59  37 - 145 mcg/dL Final    Iron " Saturation (TSAT) 08/07/2023 17 (L)  20 - 50 % Final    Transferrin 08/07/2023 246  200 - 360 mg/dL Final    TIBC 08/07/2023 344  298 - 536 mcg/dL Final    Ferritin 08/07/2023 107.00  13.00 - 150.00 ng/mL Final    WBC 08/07/2023 4.04  3.40 - 10.80 10*3/mm3 Final    RBC 08/07/2023 3.36 (L)  3.77 - 5.28 10*6/mm3 Final    Hemoglobin 08/07/2023 10.4 (L)  12.0 - 15.9 g/dL Final    Hematocrit 08/07/2023 33.0 (L)  34.0 - 46.6 % Final    MCV 08/07/2023 98.2 (H)  79.0 - 97.0 fL Final    MCH 08/07/2023 31.0  26.6 - 33.0 pg Final    MCHC 08/07/2023 31.5  31.5 - 35.7 g/dL Final    RDW 08/07/2023 15.6 (H)  12.3 - 15.4 % Final    RDW-SD 08/07/2023 56.4 (H)  37.0 - 54.0 fl Final    MPV 08/07/2023 9.5  6.0 - 12.0 fL Final    Platelets 08/07/2023 198  140 - 450 10*3/mm3 Final    Neutrophil % 08/07/2023 72.6  42.7 - 76.0 % Final    Lymphocyte % 08/07/2023 16.3 (L)  19.6 - 45.3 % Final    Monocyte % 08/07/2023 10.4  5.0 - 12.0 % Final    Eosinophil % 08/07/2023 0.0 (L)  0.3 - 6.2 % Final    Basophil % 08/07/2023 0.5  0.0 - 1.5 % Final    Immature Grans % 08/07/2023 0.2  0.0 - 0.5 % Final    Neutrophils, Absolute 08/07/2023 2.93  1.70 - 7.00 10*3/mm3 Final    Lymphocytes, Absolute 08/07/2023 0.66 (L)  0.70 - 3.10 10*3/mm3 Final    Monocytes, Absolute 08/07/2023 0.42  0.10 - 0.90 10*3/mm3 Final    Eosinophils, Absolute 08/07/2023 0.00  0.00 - 0.40 10*3/mm3 Final    Basophils, Absolute 08/07/2023 0.02  0.00 - 0.20 10*3/mm3 Final    Immature Grans, Absolute 08/07/2023 0.01  0.00 - 0.05 10*3/mm3 Final    nRBC 08/07/2023 0.0  0.0 - 0.2 /100 WBC Final       ASSESSMENT:   is a 81 y/o AAF with past medical history significant for depression, HTN & dyslipidemia who comes for anemia evaluation & management.     # Normocytic anemia:  Patient was noted to have low Hb/Hct of 10.3/31.1 on a routine CBC from 1/19/22. WBC, platelets & differential were within normal limits. Additional work up showed no evidence of iron, vitamin B12 or folate  deficiency. This prompted referral to this clinic.   On chart review,her hemoglobin appears to be in 9-10 gm/dl range at least since October 2021. No prior records available at this time. Patient had recently moved from [place], [place] to [place], [place].   Other than fatigue & possible hallucinations, she denies any major symptoms. Has poor appetite. Denies bleeding from anywhere.   Work up performed in this clinic on 2/16/22 show improved Hb to 11.3 gm/dl. Additional labs negative for any hemolysis. EPO level elevated. Peripheral smear review performed by me in clinic shows normocytic RBCs, no increased schistocytes, no blasts or increased immature cells, adequate platelets.    SPEP/HARRISON & FLC show presence of IgG kappa M-protein, 1.6 gm/dl.  FLC ratio mildly elevated at 7.51. No evidence of hypercalcemia, renal dysfunction or any other cytopenias. This is most consistent with MGUS, intermediate risk & unlikely contributing to anemia.   Her anemia is likely age related bone marrow suppression +/- nutritional intake.   CBC from 7/11/2022 shows overall stable hemoglobin at 10.3.  CBC from 1/16/2023 shows overall stable hemoglobin at 10.7.  CBC from 8/7/2023 showed overall stable hemoglobin of 10.4.  Iron profile stable as well with mild iron deficiency.  Encouraged to increase PO intake and continue PO Iron supplements twice daily.   Will plan to monitor for now & repeat labs in 6 months.     # MGUS, low risk:  The SPEP/HARRISON & FLC from 2/16/22 show presence of IgG kappa M-protein, 1.6 gm/dl.  FLC ratio mildly elevated at 7.51. No evidence of hypercalcemia, renal dysfunction or any other cytopenias.  This is most consistent with MGUS, intermediate risk & unlikely contributing to anemia.   Repeat SPEP/HARRISON from January 2023 shows slight increase in M protein to 1.8.  Plan to monitor & repeat labs at follow-up in 6 months    # Depressions and suspected hallucinations:  She reports feeling unsafe at her assisted living facility.  She thinks someone is following her and trying to harm her. She has discussed this matter with her family & staff at the facility.   Advised to continue to discuss with her family/friends.  She follows up with psychiatry.    # Hypertension:  BP better controlled now    # Cold sensation: Will check TSH and free T4 today    PLAN:   - Work up performed in this clinic so far negative for B12 & folate deficiency.  Mild iron deficiency +.  Negative for any hemolysis. EPO level elevated. SPEP/HARRISON most consistent with MGUS, intermediate risk & unlikely contributing to anemia.   - Her anemia is likely age related bone marrow suppression +/- mild iron deficiency  -Hemoglobin overall stable at 10.4 today.  - Encouraged to increase PO intake and continue PO Iron supplements twice daily.   -Check TSH and free T4.  Replace if low  - Will plan to monitor for now & repeat labs in 6 months.   -Advised close follow-up with PCP and psychiatry regarding other chronic medical issues.    Orders Placed This Encounter   Procedures    Iron Profile     Standing Status:   Future     Number of Occurrences:   1     Standing Expiration Date:   8/4/2024     Order Specific Question:   Release to patient     Answer:   Routine Release    Ferritin     Standing Status:   Future     Number of Occurrences:   1     Standing Expiration Date:   8/4/2024     Order Specific Question:   Release to patient     Answer:   Routine Release    TSH     Order Specific Question:   Release to patient     Answer:   Routine Release    T4, Free     Order Specific Question:   Release to patient     Answer:   Routine Release   Total time spent during this patient encounter is 45 minutes. The total time spent with the patient includes at least one or more of the following: preparing to see the patient by reviewing of tests, prior notes or other relevant information, performing appropriate independent examination & evaluation, counseling, ordering of medications, tests or  procedures, communicating with other healthcare professionals, when appropriate to coordinate care, documenting clinic information in the electronic medical records or other health records, independently interpreting results of tests and communicating the results to the patient/family or caregiver.

## 2023-08-08 LAB
ALBUMIN SERPL ELPH-MCNC: 4 G/DL (ref 2.9–4.4)
ALBUMIN/GLOB SERPL: 1 {RATIO} (ref 0.7–1.7)
ALPHA1 GLOB SERPL ELPH-MCNC: 0.2 G/DL (ref 0–0.4)
ALPHA2 GLOB SERPL ELPH-MCNC: 0.7 G/DL (ref 0.4–1)
B-GLOBULIN SERPL ELPH-MCNC: 1.1 G/DL (ref 0.7–1.3)
GAMMA GLOB SERPL ELPH-MCNC: 2 G/DL (ref 0.4–1.8)
GLOBULIN SER-MCNC: 4.1 G/DL (ref 2.2–3.9)
IGA SERPL-MCNC: 132 MG/DL (ref 64–422)
IGG SERPL-MCNC: 2438 MG/DL (ref 586–1602)
IGM SERPL-MCNC: 15 MG/DL (ref 26–217)
INTERPRETATION SERPL IEP-IMP: ABNORMAL
KAPPA LC FREE SER-MCNC: 120.3 MG/L (ref 3.3–19.4)
KAPPA LC FREE/LAMBDA FREE SER: 10.65 {RATIO} (ref 0.26–1.65)
LABORATORY COMMENT REPORT: ABNORMAL
LAMBDA LC FREE SERPL-MCNC: 11.3 MG/L (ref 5.7–26.3)
M PROTEIN SERPL ELPH-MCNC: 1.6 G/DL
PROT SERPL-MCNC: 8.1 G/DL (ref 6–8.5)

## 2023-08-10 RX ORDER — AMLODIPINE BESYLATE 5 MG/1
TABLET ORAL
Qty: 90 TABLET | OUTPATIENT
Start: 2023-08-10

## 2023-08-10 NOTE — TELEPHONE ENCOUNTER
Rx Refill Note  Requested Prescriptions     Pending Prescriptions Disp Refills    amLODIPine (NORVASC) 5 MG tablet [Pharmacy Med Name: AMLODIPINE BESYLATE 5MG TABLETS] 90 tablet      Sig: TAKE 1 TABLET BY MOUTH DAILY      Last office visit with prescribing clinician: 10/28/2022   Last telemedicine visit with prescribing clinician: Visit date not found   Next office visit with prescribing clinician: Visit date not found

## 2023-08-14 ENCOUNTER — TELEPHONE (OUTPATIENT)
Dept: ONCOLOGY | Facility: CLINIC | Age: 83
End: 2023-08-14
Payer: MEDICARE

## 2023-08-14 NOTE — TELEPHONE ENCOUNTER
Caller: TAWNY DIAS    Relationship: DAUGHTER     Best call back number: [phone]    What test was performed: TSH/T4 LABS    When was the test performed: 08/07    Where was the test performed: [facility]

## 2023-08-15 ENCOUNTER — TELEPHONE (OUTPATIENT)
Dept: FAMILY MEDICINE CLINIC | Facility: CLINIC | Age: 83
End: 2023-08-15

## 2023-08-15 NOTE — TELEPHONE ENCOUNTER
Caller: VIC ZUNIGA    Relationship: Emergency Contact    Best call back number: [phone]     What orders are you requesting (i.e. lab or imaging): RIGHT BREAST BIOPSY.    Additional notes: PATIENT'S SISTER STATED THAT PATIENT HAD A MAMMOGRAM DONE AND IT WAS RECOMMENDED THAT SHE HAD A BIOPSY. PATIENT DECLINED TO HAVE IT AT THE TIME, BUT WOULD LIKE TO GO AHEAD WITH THE BIOPSY NOW. PATIENT HAS A KNOT ON HER RIGHT BREAST. PLEASE ADVISE.

## 2023-08-23 ENCOUNTER — OFFICE VISIT (OUTPATIENT)
Dept: FAMILY MEDICINE CLINIC | Facility: CLINIC | Age: 83
End: 2023-08-23
Payer: MEDICARE

## 2023-08-23 VITALS
RESPIRATION RATE: 16 BRPM | WEIGHT: 132 LBS | TEMPERATURE: 97.4 F | DIASTOLIC BLOOD PRESSURE: 80 MMHG | OXYGEN SATURATION: 98 % | BODY MASS INDEX: 21.99 KG/M2 | HEIGHT: 65 IN | SYSTOLIC BLOOD PRESSURE: 125 MMHG | HEART RATE: 72 BPM

## 2023-08-23 DIAGNOSIS — I10 PRIMARY HYPERTENSION: ICD-10-CM

## 2023-08-23 DIAGNOSIS — N63.12 MASS OF UPPER INNER QUADRANT OF RIGHT BREAST: Primary | ICD-10-CM

## 2023-08-23 PROCEDURE — 99214 OFFICE O/P EST MOD 30 MIN: CPT

## 2023-08-23 PROCEDURE — 3079F DIAST BP 80-89 MM HG: CPT

## 2023-08-23 PROCEDURE — 1160F RVW MEDS BY RX/DR IN RCRD: CPT

## 2023-08-23 PROCEDURE — 3074F SYST BP LT 130 MM HG: CPT

## 2023-08-23 PROCEDURE — 1159F MED LIST DOCD IN RCRD: CPT

## 2023-08-23 RX ORDER — AMLODIPINE BESYLATE 5 MG/1
5 TABLET ORAL DAILY
Qty: 90 TABLET | Refills: 1 | Status: SHIPPED | OUTPATIENT
Start: 2023-08-23

## 2023-08-23 NOTE — PROGRESS NOTES
"Chief Complaint  Follow-up and Breast Mass    Subjective          History of Present Illness    Saima Ornelas 82 y.o. female presents on mammogram results.  The patient had a bilateral diagnostic mammogram and right breast ultrasound performed on 7/26/2022.  Results were as follows:    There were small calcifications and a 1.5 cm mass in the patient's right breast that could be a sebaceous cyst.  A biopsy of the right breast is recommended along with a 6-month follow-up ultrasound.  I will order a referral to general surgery.  Let the patient know she will get a phone call to set up this appointment.     The patient was referred to Dr. Jensen, breast surgery on 7/26/2022.  Today, the patient reports she never received a phone call to set up the appointment.  Will order a repeat referral to breast surgeon today.    She is also requesting a medication refill for Amlodipine 5 mg.  She takes the medication for hypertension.  Blood pressure is well controlled on current medication.  She tolerates the medication well with no side effects.  Medication reviewed.  Will refill medication.      Review of Systems   Constitutional:  Negative for chills and fever.   Respiratory:  Negative for chest tightness and shortness of breath.    Cardiovascular:  Negative for chest pain and palpitations.   Skin:  Negative for rash and wound.        Right breast lump        Objective   Vital Signs:   /80 (BP Location: Left arm, Patient Position: Sitting, Cuff Size: Adult)   Pulse 72   Temp 97.4 øF (36.3 øC) (Oral)   Resp 16   Ht 166 cm (65.35\")   Wt 59.9 kg (132 lb)   SpO2 98%   BMI 21.73 kg/mý      BMI is within normal parameters. No other follow-up for BMI required.        Physical Exam  Vitals and nursing note reviewed.   Constitutional:       General: She is not in acute distress.     Appearance: Normal appearance. She is well-developed. She is not ill-appearing or toxic-appearing.   HENT:      Head: Normocephalic.   Eyes:    "   General: No scleral icterus.     Pupils: Pupils are equal, round, and reactive to light.   Neck:      Thyroid: No thyromegaly.   Cardiovascular:      Rate and Rhythm: Normal rate and regular rhythm.      Pulses: Normal pulses.   Pulmonary:      Effort: Pulmonary effort is normal. No respiratory distress.   Chest:      Chest wall: No mass.   Breasts:     Right: Mass present. No swelling, bleeding, nipple discharge, skin change or tenderness.      Left: No swelling, bleeding, mass, nipple discharge, skin change or tenderness.      Comments: Firm, moveable lump located at the upper inner quadrant of the right breast at the 1 o'clock position, approximately 1-1/2 cm with dark center is non-tender with palpation.  Musculoskeletal:         General: No deformity.      Cervical back: Normal range of motion and neck supple.   Skin:     General: Skin is warm.      Coloration: Skin is not jaundiced.   Neurological:      Mental Status: She is alert. Mental status is at baseline.      Cranial Nerves: No dysarthria or facial asymmetry.      Sensory: Sensation is intact.      Motor: No weakness, tremor, atrophy or abnormal muscle tone.      Coordination: Coordination is intact.      Gait: Gait is intact. Gait normal.   Psychiatric:         Attention and Perception: Attention normal.         Mood and Affect: Mood normal. Mood is not anxious or depressed. Affect is not tearful.         Speech: Speech normal. Speech is not slurred.         Behavior: Behavior normal.         Thought Content: Thought content does not include suicidal ideation. Thought content does not include suicidal plan.        The following data was reviewed by: VIOLET Moyer on 08/23/2023:  Mammo diagnostic digital tomosynthesis bilateral w CAD (07/26/2022 10:27)  US Breast Right Limited (07/26/2022 11:08)             Assessment and Plan      Diagnoses and all orders for this visit:    1. Mass of upper inner quadrant of right breast  (Primary)  Comments:  Repeat referral to breast surgeon  Orders:  -     Ambulatory Referral to Breast Surgery    2. Primary hypertension  Comments:  Well-controlled, asymptomatic  Continue Amlodipine 5 mg daily  DASH diet, exercise as tolerated  Follow-up in 6 months  Orders:  -     amLODIPine (NORVASC) 5 MG tablet; Take 1 tablet by mouth Daily.  Dispense: 90 tablet; Refill: 1            Follow Up     Return if symptoms worsen or fail to improve.    Patient was given instructions and counseling regarding her condition or for health maintenance advice. Please see specific information pulled into the AVS if appropriate.     -Reurn if symptoms worsen or do not improve.

## 2023-08-24 ENCOUNTER — TELEPHONE (OUTPATIENT)
Dept: SURGERY | Facility: CLINIC | Age: 83
End: 2023-08-24
Payer: MEDICARE

## 2023-09-06 ENCOUNTER — TELEPHONE (OUTPATIENT)
Dept: SURGERY | Facility: CLINIC | Age: 83
End: 2023-09-06
Payer: MEDICARE

## 2023-09-06 NOTE — TELEPHONE ENCOUNTER
Lvm to call back to [facility] new pt appt with cynthia briggs for bi rads 4

## 2023-09-12 ENCOUNTER — TELEPHONE (OUTPATIENT)
Dept: SURGERY | Facility: CLINIC | Age: 83
End: 2023-09-12
Payer: MEDICARE

## 2023-09-12 NOTE — TELEPHONE ENCOUNTER
Lvm to call back to [facility] new pt appy with cynthia briggs for bi rads 4

## 2023-09-26 NOTE — PROGRESS NOTES
[facility]     Referring Provider: VIOLET Moyer     Chief complaint: abnormal breast imaging     HPI: Ms. Saima Ornelas is a 81 yo woman, seen at the request of VIOLET Moyer, for abnormal breast imaging    I personally reviewed her records and summarized her relevant breast history/imagin2022 diagnostic bilateral mammogram and right limited ultrasound at [facility]  FINDINGS: Bilateral digital CC and MLO mammographic and Tomosynthesis  images were obtained. No prior examinations available for comparison.  Scattered fibroglandular densities are seen throughout both breasts. In  the middle and posterior one thirds of the right breast at the 12  o'clock position there are linear branching microcalcifications that  spans a distance of approximately 2 cm in greatest dimension.  In the anterior one third upper inner quadrant of the right breast  underlying a triangular skin marker representing the site of palpable  concern there is a partially obscured and partially circumscribed round  mass that measures on the order of 1.4 cm in greatest dimension. No  suspicious findings are seen within left breast. I see no evidence for  architectural distortion in either breast. There is no evidence for skin  thickening, nipple retraction or axillary adenopathy.  ULTRASOUND: Targeted sonographic evaluation of the right breast was  performed through the area of palpable concern was corresponds to the 1  o'clock position on the order of 6 cm from the nipple. At this location  there is a 1.5 x 1.3 x 0.8 cm oval circumscribed complex hypoechoic mass  with low-level internal echoes and no detectable internal vascularity.  Posterior acoustic enhancement is noted. The mass appears partially  invested by skin. The imaging features suggest a sebaceous cyst.  IMPRESSION:  1. There are linear branching microcalcifications seen in the middle and  posterior one third of the right breast at the 12 o'clock position  spanning  a distance of approximately 2 cm in greatest dimension.  Correlation with a stereotactic guided right breast biopsy is  recommended.  2. There is a 1.5 cm oval circumscribed complex hypoechoic mass that  appears partially invested by skin and is seen in the right breast at  the 1 o'clock position at the site of palpable concern. This likely  represents a sebaceous cyst. Six-month sonographic followup is  recommended. Surgical excision of the lesion can be performed if  desired.  3. There are no findings suspicious for malignancy in the left breast.  BI-RADS Category 4: Suspicious. Biopsy should be considered.       She has a family history of breast cancer in her maternal grandmother age 70s.  She denies any family history of ovarian cancer.     Today she presents with concerns regarding the need of a biopsy from imaging dated July 2022.  Since then she has not had any imaging.  She also has a sebaceous cyst on the right breast that has been there since she was a teenager.  She is never wanted to have it removed.  When she was younger she would squeeze it and make it get smaller but she is stopped doing that later in life has grown since her last imaging over a year ago.  It does not bother her in any way and she does not want to have it removed  She denies any breast pain, skin changes, or nipple discharge.         Review of Systems - Oncology    Medications:    Current Outpatient Medications:     amLODIPine (NORVASC) 5 MG tablet, Take 1 tablet by mouth Daily., Disp: 90 tablet, Rfl: 1    aspirin 81 MG EC tablet, Take 1 tablet every day by oral route., Disp: , Rfl:     FeroSul 325 (65 Fe) MG tablet, TAKE 1 TABLET BY MOUTH TWICE DAILY WITH MEALS, Disp: 180 tablet, Rfl: 0    OLANZapine (zyPREXA) 2.5 MG tablet, , Disp: , Rfl:     pravastatin (PRAVACHOL) 80 MG tablet, TAKE 1 TABLET BY MOUTH DAILY, Disp: 90 tablet, Rfl: 1    Allergies:  No Known Allergies    Medical history:  Past Medical History:   Diagnosis Date     Arthritis     Depression     Headache     Hyperlipidemia     Hypertension        Surgical History:  History reviewed. No pertinent surgical history.    Family History:  Family History   Problem Relation Age of Onset    Breast cancer Neg Hx        Social History:   Social History     Socioeconomic History    Marital status:    Tobacco Use    Smoking status: Never     Passive exposure: Never    Smokeless tobacco: Never    Tobacco comments:     social   Vaping Use    Vaping Use: Never used   Substance and Sexual Activity    Alcohol use: Yes    Sexual activity: Defer     Patient drinks 1 servings of caffeine per day.       GYNECOLOGIC HISTORY:   . P: 5. AB: 0.  Last menstrual period: early 50s  Age at menarche: 15  Age at first childbirth: 21  Lactation/How long: N/A  Age at menopause: 50s  Total years of oral contraceptive use: 15+  Total years of hormone replacement therapy: 3-4years      Physical Exam  Vitals:    23 0843   BP: 142/80   Pulse: 68   SpO2: 98%     ECOG 0 - Asymptomatic  General: NAD, well appearing  Psych: a&o x 3, normal mood and affect  Eyes: EOMI, no scleral icterus  ENMT: neck supple without masses or thyromegaly, mucus membranes moist  Resp: normal effort, CTAB  CV: RRR, no murmurs, no edema   GI: soft, NT, ND  MSK: normal gait, normal ROM in bilateral shoulders  Lymph nodes: no cervical, supraclavicular or axillary lymphadenopathy  Breast: symmetric, small   Right: No visible abnormalities on inspection while seated, with arms raised or hands on hips. No masses, skin changes, or nipple abnormalities. 2cm sub cyst at 1:00 3 cm from nipple  Left: No visible abnormalities on inspection while seated, with arms raised or hands on hips. No masses, skin changes, or nipple abnormalities.      Assessment:    Abnormal breast imaging  Sub cyst- right breast     Discussion:  Last breast imaging was from 2022 that suggested a biopsy considering this imaging is overdue old we need to do  bilateral before proceeding with the biopsy at this time.    Plan:  Dionicio dx mammo in the near future and call with results  Monthly self breast exams  Can have sub cyst removed if she desires but for now she wants to leave it alone      VIOLET White    I have spent 45 mins in face to face time with the patient and in chart review.    CC:  VIOLET Moyer  Via, VIOLET Harper    EMR Dragon/transcription disclaimer:  Dictated using Dragon dictation

## 2023-09-29 ENCOUNTER — TELEPHONE (OUTPATIENT)
Dept: SURGERY | Facility: CLINIC | Age: 83
End: 2023-09-29
Payer: MEDICARE

## 2023-09-29 ENCOUNTER — OFFICE VISIT (OUTPATIENT)
Dept: SURGERY | Facility: CLINIC | Age: 83
End: 2023-09-29
Payer: MEDICARE

## 2023-09-29 VITALS
BODY MASS INDEX: 21.99 KG/M2 | OXYGEN SATURATION: 98 % | WEIGHT: 132 LBS | HEIGHT: 65 IN | SYSTOLIC BLOOD PRESSURE: 142 MMHG | DIASTOLIC BLOOD PRESSURE: 80 MMHG | HEART RATE: 68 BPM

## 2023-09-29 DIAGNOSIS — R92.8 ABNORMAL FINDING ON BREAST IMAGING: Primary | ICD-10-CM

## 2023-11-07 ENCOUNTER — HOSPITAL ENCOUNTER (OUTPATIENT)
Dept: ULTRASOUND IMAGING | Facility: HOSPITAL | Age: 83
Discharge: HOME | End: 2023-11-07
Payer: MEDICARE

## 2023-11-07 ENCOUNTER — HOSPITAL ENCOUNTER (OUTPATIENT)
Dept: MAMMOGRAPHY | Facility: HOSPITAL | Age: 83
Discharge: HOME | End: 2023-11-07
Admitting: NURSE PRACTITIONER
Payer: MEDICARE

## 2023-11-07 DIAGNOSIS — R92.8 ABNORMAL FINDING ON BREAST IMAGING: ICD-10-CM

## 2023-11-07 PROCEDURE — 77066 DX MAMMO INCL CAD BI: CPT

## 2023-11-07 PROCEDURE — 76642 ULTRASOUND BREAST LIMITED: CPT

## 2023-11-07 PROCEDURE — G0279 TOMOSYNTHESIS, MAMMO: HCPCS

## 2023-11-10 ENCOUNTER — PREP FOR SURGERY (OUTPATIENT)
Dept: OTHER | Facility: HOSPITAL | Age: 83
End: 2023-11-10
Payer: MEDICARE

## 2023-11-10 ENCOUNTER — TELEPHONE (OUTPATIENT)
Dept: SURGERY | Facility: CLINIC | Age: 83
End: 2023-11-10
Payer: MEDICARE

## 2023-11-10 DIAGNOSIS — R92.8 ABNORMAL FINDING ON BREAST IMAGING: Primary | ICD-10-CM

## 2023-11-10 NOTE — TELEPHONE ENCOUNTER
Spoke to pt sister and let her know that pt needs a rght stereo bx and I scheduled her on 11/27 at 830am be there at 730am and if she is still taking asprin to hold for 7 days     Pt sister stated understanding

## 2023-11-10 NOTE — TELEPHONE ENCOUNTER
I am unable to get in contact with Saima.  I tried to call her twice and left 2 messages.  I went ahead and placed an order for a right stereotactic breast biopsy.  We please set this up for the patient and inform her that her imaging suggested that she get this done.  And have her follow-up with me in office after the biopsy

## 2023-11-14 ENCOUNTER — TELEPHONE (OUTPATIENT)
Dept: SURGERY | Facility: CLINIC | Age: 83
End: 2023-11-14
Payer: MEDICARE

## 2023-11-14 NOTE — TELEPHONE ENCOUNTER
Spoke to pt and let her know other then the sebaceous cyst there is a new area that needs to be biopsy patient stated that she would rather wait to see her primary care and hear it from them that she needs a biopsy     At this time patient is refusing biopsy

## 2023-11-21 NOTE — NURSING NOTE
Precall placed/no answer/VMM left with my name/#/reason for call/arriv time 0730 & locatn ([facility]) left on VM/wear bra/no Aspirin 5 days prior/no NSAIDs/Vit E/Fish oil 3 days prior/ok to eat/drink/take other routine meds prior/call if questns.

## 2023-11-24 DIAGNOSIS — I10 PRIMARY HYPERTENSION: ICD-10-CM

## 2023-11-27 ENCOUNTER — HOSPITAL ENCOUNTER (OUTPATIENT)
Dept: MAMMOGRAPHY | Facility: HOSPITAL | Age: 83
Discharge: HOME | End: 2023-11-27
Admitting: NURSE PRACTITIONER
Payer: MEDICARE

## 2023-11-27 RX ORDER — AMLODIPINE BESYLATE 5 MG/1
5 TABLET ORAL DAILY
Qty: 90 TABLET | Refills: 0 | Status: SHIPPED | OUTPATIENT
Start: 2023-11-27

## 2023-11-27 NOTE — TELEPHONE ENCOUNTER
Rx Refill Note  Requested Prescriptions     Pending Prescriptions Disp Refills    amLODIPine (NORVASC) 5 MG tablet [Pharmacy Med Name: AMLODIPINE BESYLATE 5MG TABLETS] 90 tablet 1     Sig: TAKE 1 TABLET BY MOUTH DAILY      Last office visit with prescribing clinician: 8/23/2023   Last telemedicine visit with prescribing clinician: Visit date not found   Next office visit with prescribing clinician: Visit date not found

## 2023-11-27 NOTE — NURSING NOTE
Ms. Has not arrived for procedure. Call to her on the # marked as her primary # [phone] but only got VM. Left message that I was calling to see if she was coming for her biopsy today. Left my name and number.

## 2024-01-05 ENCOUNTER — TELEPHONE (OUTPATIENT)
Dept: FAMILY MEDICINE CLINIC | Facility: CLINIC | Age: 84
End: 2024-01-05
Payer: MEDICARE

## 2024-01-05 NOTE — TELEPHONE ENCOUNTER
Caller: KATHY DIAS    Relationship: Child    Best call back number: [phone]    What is the best time to reach you: ANY     Who are you requesting to speak with (clinical staff, provider,  specific staff member): CLINICAL    Do you know the name of the person who called: KATHY    What was the call regarding: DAUGHTER HAS QUESTIONS ABOUT HER MOM'S APPOINTMENT

## 2024-01-08 ENCOUNTER — TELEPHONE (OUTPATIENT)
Dept: FAMILY MEDICINE CLINIC | Facility: CLINIC | Age: 84
End: 2024-01-08
Payer: MEDICARE

## 2024-01-08 NOTE — TELEPHONE ENCOUNTER
Family have deep concerns r/t pt memory/ actions/ medication.   Pt is having paranorma. No s/s of UTI. Wt loss.  Family is needing a game plan as for pt to at least have a 24 hr ops for eval. Pt seen past in ER, but sent home r/t no findings of cognitive issues.   Please listen to family concerns.

## 2024-01-17 ENCOUNTER — TELEPHONE (OUTPATIENT)
Dept: FAMILY MEDICINE CLINIC | Facility: CLINIC | Age: 84
End: 2024-01-17
Payer: MEDICARE

## 2024-01-17 NOTE — TELEPHONE ENCOUNTER
Caller: TAWNY (NOT ON [facility])    Relationship: Child    Best call back number: [phone]    What is the best time to reach you: ANYTIME    Who are you requesting to speak with (clinical staff, provider,  specific staff member): AMEENA? STATED NURSE MANAGER    What was the call regarding: PATIENT'S DAUGHTER IS REQUESTING A CALLBACK TO DISCUSS ABOUT PATIENT'S APPOINTMENT ON 1/18/24

## 2024-01-18 ENCOUNTER — OFFICE VISIT (OUTPATIENT)
Dept: FAMILY MEDICINE CLINIC | Facility: CLINIC | Age: 84
End: 2024-01-18
Payer: MEDICARE

## 2024-01-18 VITALS
SYSTOLIC BLOOD PRESSURE: 122 MMHG | HEIGHT: 65 IN | DIASTOLIC BLOOD PRESSURE: 60 MMHG | OXYGEN SATURATION: 95 % | BODY MASS INDEX: 22.06 KG/M2 | RESPIRATION RATE: 14 BRPM | HEART RATE: 62 BPM | TEMPERATURE: 96.6 F | WEIGHT: 132.4 LBS

## 2024-01-18 DIAGNOSIS — Z78.0 POST-MENOPAUSAL: ICD-10-CM

## 2024-01-18 DIAGNOSIS — F22 DELUSIONS: ICD-10-CM

## 2024-01-18 DIAGNOSIS — F01.50 VASCULAR DEMENTIA, UNSPECIFIED DEMENTIA SEVERITY, UNSPECIFIED WHETHER BEHAVIORAL, PSYCHOTIC, OR MOOD DISTURBANCE OR ANXIETY: Primary | ICD-10-CM

## 2024-01-18 NOTE — PROGRESS NOTES
"Chief Complaint  Dementia, memory issues , and Hypertension    Subjective        HPI   Saima presents to [facility] for evaluation of memory.  Review of hematology note from August during today's visit.  Patient is being followed for anemia.  Recent labs showing some abnormal light chains.  She has follow-up with hematology in February.  Patient is accompanied by her daughter who lives in [place] ([facility])  Jeny Courtney is daughter who is health director for patient        Objective   Vital Signs:   Vitals:    01/18/24 1402   BP: 122/60   Pulse: 62   Resp: 14   Temp: 96.6 °F (35.9 °C)   TempSrc: Temporal   SpO2: 95%   Weight: 60.1 kg (132 lb 6.4 oz)   Height: 166 cm (65.35\")   PainSc: 0-No pain            8/7/2023     1:44 PM   PHQ-2/PHQ-9 Depression Screening   Little Interest or Pleasure in Doing Things 3-->nearly every day   Feeling Down, Depressed or Hopeless 3-->nearly every day   Trouble Falling or Staying Asleep, or Sleeping Too Much 0-->not at all   Feeling Tired or Having Little Energy 3-->nearly every day   Poor Appetite or Overeating 0-->not at all   Feeling Bad about Yourself - or that You are a Failure or Have Let Yourself or Your Family Down 3-->nearly every day   Trouble Concentrating on Things, Such as Reading the Newspaper or Watching Television 1-->several days   Moving or Speaking So Slowly that Other People Could Have Noticed? Or the Opposite - Being So Fidgety 0-->not at all   Thoughts that You Would be Better Off Dead or of Hurting Yourself in Some Way 0-->not at all   PHQ-9: Brief Depression Severity Measure Score 13   If You Checked Off Any Problems, How Difficult Have These Problems Made It For You to Do Your Work, Take Care of Things at Home, or Get Along with Other People? very difficult       BMI is within normal parameters. No other follow-up for BMI required.        Physical Exam  Neurological:      Mental Status: She is alert.   Psychiatric:         " Attention and Perception: Attention normal.         Cognition and Memory: Cognition is impaired. Memory is impaired.          Result Review :                Assessment and Plan    Assessment & Plan  Vascular dementia, unspecified dementia severity, unspecified whether behavioral, psychotic, or mood disturbance or anxiety  (new problem to this provider)  Referral to neurology.  Continue aspirin therapy.  Continue with pravastatin.  Current MoCA test score 21 out of 30  Delusions  (new problem to this provider)   Delusions will be reevaluated with psychiatry.  Patient currently on olanzapine.    Orders Placed This Encounter   Procedures    Ambulatory Referral to Neurology    Ambulatory Referral to Psychiatry           I spent 30 minutes caring for Saima on this date of service. This time includes time spent by me in the following activities:obtaining and/or reviewing a separately obtained history, performing a medically appropriate examination and/or evaluation , counseling and educating the patient/family/caregiver, referring and communicating with other health care professionals , documenting information in the medical record, and independently interpreting results and communicating that information with the patient/family/caregiver  Follow Up   Return if symptoms worsen or fail to improve.  Patient was given instructions and counseling regarding her condition or for health maintenance advice. Please see specific information pulled into the AVS if appropriate.

## 2024-01-31 ENCOUNTER — TELEPHONE (OUTPATIENT)
Dept: ONCOLOGY | Facility: CLINIC | Age: 84
End: 2024-01-31
Payer: MEDICARE

## 2024-02-28 ENCOUNTER — TELEPHONE (OUTPATIENT)
Dept: FAMILY MEDICINE CLINIC | Facility: CLINIC | Age: 84
End: 2024-02-28
Payer: MEDICARE

## 2024-02-28 DIAGNOSIS — R44.3 HALLUCINATIONS: ICD-10-CM

## 2024-02-28 DIAGNOSIS — F43.9 STRESS AT HOME: ICD-10-CM

## 2024-02-28 DIAGNOSIS — F32.A DEPRESSION, UNSPECIFIED DEPRESSION TYPE: ICD-10-CM

## 2024-02-28 DIAGNOSIS — G47.9 SLEEP DISTURBANCES: ICD-10-CM

## 2024-02-28 DIAGNOSIS — F41.9 ANXIETY: ICD-10-CM

## 2024-02-28 DIAGNOSIS — F01.50 VASCULAR DEMENTIA, UNSPECIFIED DEMENTIA SEVERITY, UNSPECIFIED WHETHER BEHAVIORAL, PSYCHOTIC, OR MOOD DISTURBANCE OR ANXIETY: Primary | ICD-10-CM

## 2024-02-29 ENCOUNTER — TELEPHONE (OUTPATIENT)
Dept: FAMILY MEDICINE CLINIC | Facility: CLINIC | Age: 84
End: 2024-02-29
Payer: MEDICARE

## 2024-03-01 NOTE — TELEPHONE ENCOUNTER
Patient is calling in to speak with a manager about a referral she requested 2 months ago. Patient has called back continuously and still hasn't received any answers.  
Spoke with pt's family who called the afternoon of 2/29/2024. They expressed concerns that pt had not been contacted by neuro. Psych eval could not be done via video r/t not being able to function computer correctly. Pt wanted to go to [facility].   Referral has been updated, referral specialist notified as well as family. Family also given number to [facility] for further concerns r/t apptsl   
Yes

## 2024-03-04 DIAGNOSIS — D64.9 ANEMIA, UNSPECIFIED TYPE: ICD-10-CM

## 2024-03-04 DIAGNOSIS — E78.5 HYPERLIPIDEMIA, UNSPECIFIED HYPERLIPIDEMIA TYPE: ICD-10-CM

## 2024-03-04 DIAGNOSIS — I10 PRIMARY HYPERTENSION: ICD-10-CM

## 2024-03-04 RX ORDER — AMLODIPINE BESYLATE 5 MG/1
5 TABLET ORAL DAILY
Qty: 90 TABLET | Refills: 1 | Status: SHIPPED | OUTPATIENT
Start: 2024-03-04

## 2024-03-04 RX ORDER — FERROUS SULFATE 325(65) MG
1 TABLET ORAL 2 TIMES DAILY WITH MEALS
Qty: 180 TABLET | Refills: 1 | Status: SHIPPED | OUTPATIENT
Start: 2024-03-04

## 2024-03-04 RX ORDER — PRAVASTATIN SODIUM 80 MG/1
80 TABLET ORAL DAILY
Qty: 90 TABLET | Refills: 1 | Status: CANCELLED | OUTPATIENT
Start: 2024-03-04

## 2024-03-04 NOTE — TELEPHONE ENCOUNTER
Caller: Saima Ornelas    Relationship: Self    Best call back number:     [phone]       Requested Prescriptions:   Requested Prescriptions     Pending Prescriptions Disp Refills    ferrous sulfate (FeroSul) 325 (65 FE) MG tablet 180 tablet 0     Sig: Take 1 tablet by mouth 2 (Two) Times a Day With Meals.    amLODIPine (NORVASC) 5 MG tablet 90 tablet 0     Sig: Take 1 tablet by mouth Daily.    pravastatin (PRAVACHOL) 80 MG tablet 90 tablet 1     Sig: Take 1 tablet by mouth Daily.        Pharmacy where request should be sent: [id number] [address] AT [facility] [phone] [facility] [phone]      Last office visit with prescribing clinician: 8/23/2023   Last telemedicine visit with prescribing clinician: Visit date not found   Next office visit with prescribing clinician: Visit date not found     Additional details provided by patient:     Does the patient have less than a 3 day supply:  [x] Yes  [] No    Would you like a call back once the refill request has been completed: [x] Yes [] No    If the office needs to give you a call back, can they leave a voicemail: [] Yes [] No    Patsy Kessler Rep   03/04/24 14:58 EST

## 2024-03-05 ENCOUNTER — TELEPHONE (OUTPATIENT)
Dept: FAMILY MEDICINE CLINIC | Facility: CLINIC | Age: 84
End: 2024-03-05
Payer: MEDICARE

## 2024-03-05 NOTE — TELEPHONE ENCOUNTER
Caller: BERTA    Relationship:     Best call back number: [phone]     What is the best time to reach you: ANY    Who are you requesting to speak with (clinical staff, provider,  specific staff member): CLINICAL STAFF    Do you know the name of the person who called:      What was the call regarding: BERTA WITH DR CARTER OFFICE CALLED STATED SHE NEVER DID RECEIVE THE REFERRAL ON THIS PATIENT, BUT PATIENT CALLED LAST THURSDAY MADE APPT ON 3/20/24 @ 9 AM.  PATIENT TOLD HER SHE HAD AN ABNORMAL SCAN AND WANTS TO KNOW IF ROBERT CAN SEND THOSE AND HER MEDICAL RECORDS OVER TO THE OFFICE.  FAX # [phone]    Is it okay if the provider responds through MyChart:

## 2024-03-05 NOTE — TELEPHONE ENCOUNTER
Caller: Saima Ornelas    Relationship: Self    Best call back number: [phone]    What is the best time to reach you: ANY    Who are you requesting to speak with (clinical staff, provider,  specific staff member): CLINICAL STAFF    Do you know the name of the person who called:      What was the call regarding: PATIENT CALLED AND NEEDS UPDATE ON THE REFERRALS FOR PHYCHIATRIC AND NEUROLOGIST.  PATIENT STATES DR MARINO ON 1/18/23 TOLD HER THAT IT WOULD ONLY TAKE 10 DAYS AND IT HAS BEEN PAST THAT TIME FRAME.  SHE NEEDS A CALL BACK AS SOON AS POSSIBLE ON THESE REFERRALS.       Is it okay if the provider responds through MyChart:           
  Caller: Saima Ornelas    Relationship: Self    Best call back number: [phone]     What was the call regarding: PATIENT IS REQUESTING TO SPEAK WITH CLINICAL REGARDING THESE TWO REFERRALS AS SOON AS POSSIBLE. PATIENT STATED SHE HAS BEEN WAITING SINCE JANUARY 18 FOR A CALL TO BE ABLE TO SCHEDULE HER APPOINTMENTS AND NOBODY HAS REACHED OUT TO HER    PLEASE ADVISE  
Caller: KEVIN    Relationship to patient: Child    Best call back number: [phone]      Patient is needing: PATIENT'S DAUGHTER (NOT ON [facility]) STATES THAT SHE WOULD LIKE A CALLBACK TO DISCUSS REFERRALS FOR NEUROLOGY AND PSYCHIATRY.    PLEASE ADVISE.    
Left VM stating referrals have been resent.  
33

## 2024-03-18 DIAGNOSIS — E78.5 HYPERLIPIDEMIA, UNSPECIFIED HYPERLIPIDEMIA TYPE: ICD-10-CM

## 2024-03-19 RX ORDER — PRAVASTATIN SODIUM 80 MG/1
80 TABLET ORAL DAILY
Qty: 90 TABLET | Refills: 0 | Status: SHIPPED | OUTPATIENT
Start: 2024-03-19

## 2024-06-21 DIAGNOSIS — E78.5 HYPERLIPIDEMIA, UNSPECIFIED HYPERLIPIDEMIA TYPE: ICD-10-CM

## 2024-06-24 RX ORDER — PRAVASTATIN SODIUM 80 MG/1
80 TABLET ORAL DAILY
Qty: 90 TABLET | Refills: 0 | OUTPATIENT
Start: 2024-06-24

## 2024-11-24 DIAGNOSIS — D64.9 ANEMIA, UNSPECIFIED TYPE: ICD-10-CM

## 2024-11-25 NOTE — TELEPHONE ENCOUNTER
Rx Refill Note  Requested Prescriptions     Pending Prescriptions Disp Refills    FeroSul 325 (65 Fe) MG tablet [Pharmacy Med Name: FERROUS SULFATE 325MG (5GR) TABS] 180 tablet 1     Sig: TAKE ONE TABLET BY MOUTH TWICE DAILY WITH MEALS      Last office visit with prescribing clinician: 8/23/2023   Last telemedicine visit with prescribing clinician: Visit date not found   Next office visit with prescribing clinician: Visit date not found                         Would you like a call back once the refill request has been completed: [] Yes [] No    If the office needs to give you a call back, can they leave a voicemail: [] Yes [] No    Stephanie Sen MA  11/25/24, 08:37 EST

## 2024-11-26 RX ORDER — FERROUS SULFATE 325(65) MG
1 TABLET ORAL 2 TIMES DAILY WITH MEALS
Qty: 60 TABLET | Refills: 2 | Status: SHIPPED | OUTPATIENT
Start: 2024-11-26

## 2025-03-09 DIAGNOSIS — D64.9 ANEMIA, UNSPECIFIED TYPE: ICD-10-CM

## 2025-03-10 RX ORDER — FERROUS SULFATE 325(65) MG
1 TABLET ORAL 2 TIMES DAILY WITH MEALS
Qty: 30 TABLET | Refills: 0 | Status: SHIPPED | OUTPATIENT
Start: 2025-03-10